# Patient Record
Sex: MALE | Race: OTHER | Employment: FULL TIME | URBAN - METROPOLITAN AREA
[De-identification: names, ages, dates, MRNs, and addresses within clinical notes are randomized per-mention and may not be internally consistent; named-entity substitution may affect disease eponyms.]

---

## 2017-05-31 ENCOUNTER — APPOINTMENT (OUTPATIENT)
Dept: ULTRASOUND IMAGING | Age: 32
DRG: 432 | End: 2017-05-31
Attending: STUDENT IN AN ORGANIZED HEALTH CARE EDUCATION/TRAINING PROGRAM
Payer: SELF-PAY

## 2017-05-31 ENCOUNTER — HOSPITAL ENCOUNTER (INPATIENT)
Age: 32
LOS: 4 days | Discharge: HOME | DRG: 432 | End: 2017-06-05
Attending: STUDENT IN AN ORGANIZED HEALTH CARE EDUCATION/TRAINING PROGRAM | Admitting: HOSPITALIST
Payer: SELF-PAY

## 2017-05-31 ENCOUNTER — APPOINTMENT (OUTPATIENT)
Dept: CT IMAGING | Age: 32
DRG: 432 | End: 2017-05-31
Attending: STUDENT IN AN ORGANIZED HEALTH CARE EDUCATION/TRAINING PROGRAM
Payer: SELF-PAY

## 2017-05-31 DIAGNOSIS — K85.20 ALCOHOL-INDUCED ACUTE PANCREATITIS, UNSPECIFIED COMPLICATION STATUS: Primary | ICD-10-CM

## 2017-05-31 DIAGNOSIS — K70.10 ALCOHOLIC HEPATITIS WITHOUT ASCITES: ICD-10-CM

## 2017-05-31 PROBLEM — K85.90 PANCREATITIS: Status: ACTIVE | Noted: 2017-05-31

## 2017-05-31 LAB
ABO + RH BLD: NORMAL
ALBUMIN SERPL BCP-MCNC: 3 G/DL (ref 3.5–5)
ALBUMIN/GLOB SERPL: 0.6 {RATIO} (ref 1.1–2.2)
ALP SERPL-CCNC: 130 U/L (ref 45–117)
ALT SERPL-CCNC: 108 U/L (ref 12–78)
ANION GAP BLD CALC-SCNC: 14 MMOL/L (ref 5–15)
APPEARANCE UR: CLEAR
APTT PPP: 25.4 SEC (ref 22.1–32.5)
AST SERPL W P-5'-P-CCNC: 158 U/L (ref 15–37)
BACTERIA URNS QL MICRO: NEGATIVE /HPF
BASOPHILS # BLD AUTO: 0 K/UL (ref 0–0.1)
BASOPHILS # BLD: 0 % (ref 0–1)
BILIRUB SERPL-MCNC: 0.5 MG/DL (ref 0.2–1)
BILIRUB UR QL: NEGATIVE
BLOOD GROUP ANTIBODIES SERPL: NORMAL
BUN SERPL-MCNC: 14 MG/DL (ref 6–20)
BUN/CREAT SERPL: 15 (ref 12–20)
CALCIUM SERPL-MCNC: 8.1 MG/DL (ref 8.5–10.1)
CHLORIDE SERPL-SCNC: 100 MMOL/L (ref 97–108)
CHOLEST SERPL-MCNC: 217 MG/DL
CO2 SERPL-SCNC: 24 MMOL/L (ref 21–32)
COLOR UR: ABNORMAL
CREAT SERPL-MCNC: 0.96 MG/DL (ref 0.7–1.3)
EOSINOPHIL # BLD: 0 K/UL (ref 0–0.4)
EOSINOPHIL NFR BLD: 0 % (ref 0–7)
EPITH CASTS URNS QL MICRO: ABNORMAL /LPF
ERYTHROCYTE [DISTWIDTH] IN BLOOD BY AUTOMATED COUNT: 12.7 % (ref 11.5–14.5)
ETHANOL SERPL-MCNC: 303 MG/DL
GLOBULIN SER CALC-MCNC: 4.9 G/DL (ref 2–4)
GLUCOSE SERPL-MCNC: 117 MG/DL (ref 65–100)
GLUCOSE UR STRIP.AUTO-MCNC: NEGATIVE MG/DL
HCT VFR BLD AUTO: 43 % (ref 36.6–50.3)
HCT VFR BLD AUTO: 47.3 % (ref 36.6–50.3)
HDLC SERPL-MCNC: 52 MG/DL
HDLC SERPL: 4.2 {RATIO} (ref 0–5)
HGB BLD-MCNC: 15.8 G/DL (ref 12.1–17)
HGB BLD-MCNC: 17.5 G/DL (ref 12.1–17)
HGB UR QL STRIP: ABNORMAL
HYALINE CASTS URNS QL MICRO: ABNORMAL /LPF (ref 0–5)
INR PPP: 1 (ref 0.9–1.1)
KETONES UR QL STRIP.AUTO: ABNORMAL MG/DL
LDLC SERPL CALC-MCNC: ABNORMAL MG/DL (ref 0–100)
LDLC SERPL DIRECT ASSAY-MCNC: 71 MG/DL (ref 0–100)
LEUKOCYTE ESTERASE UR QL STRIP.AUTO: NEGATIVE
LIPASE SERPL-CCNC: 1089 U/L (ref 73–393)
LIPID PROFILE,FLP: ABNORMAL
LYMPHOCYTES # BLD AUTO: 57 % (ref 12–49)
LYMPHOCYTES # BLD: 3.9 K/UL (ref 0.8–3.5)
MAGNESIUM SERPL-MCNC: 2 MG/DL (ref 1.6–2.4)
MCH RBC QN AUTO: 31.8 PG (ref 26–34)
MCHC RBC AUTO-ENTMCNC: 37 G/DL (ref 30–36.5)
MCV RBC AUTO: 86 FL (ref 80–99)
MONOCYTES # BLD: 0.3 K/UL (ref 0–1)
MONOCYTES NFR BLD AUTO: 5 % (ref 5–13)
NEUTS SEG # BLD: 2.6 K/UL (ref 1.8–8)
NEUTS SEG NFR BLD AUTO: 38 % (ref 32–75)
NITRITE UR QL STRIP.AUTO: NEGATIVE
PH UR STRIP: 5.5 [PH] (ref 5–8)
PLATELET # BLD AUTO: 222 K/UL (ref 150–400)
POTASSIUM SERPL-SCNC: 3.3 MMOL/L (ref 3.5–5.1)
PROT SERPL-MCNC: 7.9 G/DL (ref 6.4–8.2)
PROT UR STRIP-MCNC: 300 MG/DL
PROTHROMBIN TIME: 10.3 SEC (ref 9–11.1)
RBC # BLD AUTO: 5.5 M/UL (ref 4.1–5.7)
RBC #/AREA URNS HPF: ABNORMAL /HPF (ref 0–5)
SODIUM SERPL-SCNC: 138 MMOL/L (ref 136–145)
SP GR UR REFRACTOMETRY: 1.01 (ref 1–1.03)
SPECIMEN EXP DATE BLD: NORMAL
THERAPEUTIC RANGE,PTTT: NORMAL SECS (ref 58–77)
TRIGL SERPL-MCNC: 1159 MG/DL (ref ?–150)
UROBILINOGEN UR QL STRIP.AUTO: 0.2 EU/DL (ref 0.2–1)
VLDLC SERPL CALC-MCNC: ABNORMAL MG/DL
WBC # BLD AUTO: 6.9 K/UL (ref 4.1–11.1)
WBC URNS QL MICRO: ABNORMAL /HPF (ref 0–4)

## 2017-05-31 PROCEDURE — 81001 URINALYSIS AUTO W/SCOPE: CPT | Performed by: STUDENT IN AN ORGANIZED HEALTH CARE EDUCATION/TRAINING PROGRAM

## 2017-05-31 PROCEDURE — 74177 CT ABD & PELVIS W/CONTRAST: CPT

## 2017-05-31 PROCEDURE — 74011000258 HC RX REV CODE- 258: Performed by: STUDENT IN AN ORGANIZED HEALTH CARE EDUCATION/TRAINING PROGRAM

## 2017-05-31 PROCEDURE — 74011250636 HC RX REV CODE- 250/636: Performed by: INTERNAL MEDICINE

## 2017-05-31 PROCEDURE — 86900 BLOOD TYPING SEROLOGIC ABO: CPT | Performed by: STUDENT IN AN ORGANIZED HEALTH CARE EDUCATION/TRAINING PROGRAM

## 2017-05-31 PROCEDURE — 83721 ASSAY OF BLOOD LIPOPROTEIN: CPT | Performed by: FAMILY MEDICINE

## 2017-05-31 PROCEDURE — 74011250637 HC RX REV CODE- 250/637: Performed by: FAMILY MEDICINE

## 2017-05-31 PROCEDURE — 83735 ASSAY OF MAGNESIUM: CPT | Performed by: FAMILY MEDICINE

## 2017-05-31 PROCEDURE — 99218 HC RM OBSERVATION: CPT

## 2017-05-31 PROCEDURE — C9113 INJ PANTOPRAZOLE SODIUM, VIA: HCPCS | Performed by: FAMILY MEDICINE

## 2017-05-31 PROCEDURE — 36415 COLL VENOUS BLD VENIPUNCTURE: CPT | Performed by: FAMILY MEDICINE

## 2017-05-31 PROCEDURE — 85730 THROMBOPLASTIN TIME PARTIAL: CPT | Performed by: STUDENT IN AN ORGANIZED HEALTH CARE EDUCATION/TRAINING PROGRAM

## 2017-05-31 PROCEDURE — 85018 HEMOGLOBIN: CPT | Performed by: FAMILY MEDICINE

## 2017-05-31 PROCEDURE — 83690 ASSAY OF LIPASE: CPT | Performed by: EMERGENCY MEDICINE

## 2017-05-31 PROCEDURE — 80053 COMPREHEN METABOLIC PANEL: CPT | Performed by: EMERGENCY MEDICINE

## 2017-05-31 PROCEDURE — 74011250636 HC RX REV CODE- 250/636: Performed by: FAMILY MEDICINE

## 2017-05-31 PROCEDURE — 96376 TX/PRO/DX INJ SAME DRUG ADON: CPT

## 2017-05-31 PROCEDURE — 99284 EMERGENCY DEPT VISIT MOD MDM: CPT

## 2017-05-31 PROCEDURE — 74011000250 HC RX REV CODE- 250: Performed by: FAMILY MEDICINE

## 2017-05-31 PROCEDURE — 76705 ECHO EXAM OF ABDOMEN: CPT

## 2017-05-31 PROCEDURE — 74011636320 HC RX REV CODE- 636/320: Performed by: STUDENT IN AN ORGANIZED HEALTH CARE EDUCATION/TRAINING PROGRAM

## 2017-05-31 PROCEDURE — 74011250636 HC RX REV CODE- 250/636: Performed by: STUDENT IN AN ORGANIZED HEALTH CARE EDUCATION/TRAINING PROGRAM

## 2017-05-31 PROCEDURE — 96374 THER/PROPH/DIAG INJ IV PUSH: CPT

## 2017-05-31 PROCEDURE — 80307 DRUG TEST PRSMV CHEM ANLYZR: CPT | Performed by: STUDENT IN AN ORGANIZED HEALTH CARE EDUCATION/TRAINING PROGRAM

## 2017-05-31 PROCEDURE — 80061 LIPID PANEL: CPT | Performed by: FAMILY MEDICINE

## 2017-05-31 PROCEDURE — 85025 COMPLETE CBC W/AUTO DIFF WBC: CPT | Performed by: EMERGENCY MEDICINE

## 2017-05-31 PROCEDURE — 85610 PROTHROMBIN TIME: CPT | Performed by: STUDENT IN AN ORGANIZED HEALTH CARE EDUCATION/TRAINING PROGRAM

## 2017-05-31 RX ORDER — SODIUM CHLORIDE 0.9 % (FLUSH) 0.9 %
5-10 SYRINGE (ML) INJECTION AS NEEDED
Status: DISCONTINUED | OUTPATIENT
Start: 2017-05-31 | End: 2017-06-05 | Stop reason: HOSPADM

## 2017-05-31 RX ORDER — METOPROLOL TARTRATE 5 MG/5ML
5 INJECTION INTRAVENOUS ONCE
Status: COMPLETED | OUTPATIENT
Start: 2017-05-31 | End: 2017-05-31

## 2017-05-31 RX ORDER — KETOROLAC TROMETHAMINE 30 MG/ML
15 INJECTION, SOLUTION INTRAMUSCULAR; INTRAVENOUS
Status: DISCONTINUED | OUTPATIENT
Start: 2017-05-31 | End: 2017-06-01

## 2017-05-31 RX ORDER — SODIUM CHLORIDE 0.9 % (FLUSH) 0.9 %
10 SYRINGE (ML) INJECTION
Status: COMPLETED | OUTPATIENT
Start: 2017-05-31 | End: 2017-05-31

## 2017-05-31 RX ORDER — ONDANSETRON 2 MG/ML
4 INJECTION INTRAMUSCULAR; INTRAVENOUS
Status: DISCONTINUED | OUTPATIENT
Start: 2017-05-31 | End: 2017-06-05 | Stop reason: HOSPADM

## 2017-05-31 RX ORDER — HYDROMORPHONE HYDROCHLORIDE 1 MG/ML
1 INJECTION, SOLUTION INTRAMUSCULAR; INTRAVENOUS; SUBCUTANEOUS
Status: DISCONTINUED | OUTPATIENT
Start: 2017-05-31 | End: 2017-05-31

## 2017-05-31 RX ORDER — POTASSIUM CHLORIDE 7.45 MG/ML
10 INJECTION INTRAVENOUS
Status: COMPLETED | OUTPATIENT
Start: 2017-05-31 | End: 2017-05-31

## 2017-05-31 RX ORDER — LORAZEPAM 1 MG/1
2 TABLET ORAL
Status: DISCONTINUED | OUTPATIENT
Start: 2017-05-31 | End: 2017-06-01

## 2017-05-31 RX ORDER — HYDROMORPHONE HYDROCHLORIDE 1 MG/ML
1 INJECTION, SOLUTION INTRAMUSCULAR; INTRAVENOUS; SUBCUTANEOUS ONCE
Status: COMPLETED | OUTPATIENT
Start: 2017-05-31 | End: 2017-05-31

## 2017-05-31 RX ORDER — SODIUM CHLORIDE 0.9 % (FLUSH) 0.9 %
5-10 SYRINGE (ML) INJECTION EVERY 8 HOURS
Status: DISCONTINUED | OUTPATIENT
Start: 2017-05-31 | End: 2017-06-05 | Stop reason: HOSPADM

## 2017-05-31 RX ORDER — IBUPROFEN 200 MG
400-800 TABLET ORAL AS NEEDED
COMMUNITY
End: 2017-06-05

## 2017-05-31 RX ORDER — SODIUM CHLORIDE, SODIUM LACTATE, POTASSIUM CHLORIDE, CALCIUM CHLORIDE 600; 310; 30; 20 MG/100ML; MG/100ML; MG/100ML; MG/100ML
100 INJECTION, SOLUTION INTRAVENOUS CONTINUOUS
Status: DISCONTINUED | OUTPATIENT
Start: 2017-05-31 | End: 2017-06-05 | Stop reason: HOSPADM

## 2017-05-31 RX ORDER — HYDRALAZINE HYDROCHLORIDE 20 MG/ML
10 INJECTION INTRAMUSCULAR; INTRAVENOUS
Status: DISCONTINUED | OUTPATIENT
Start: 2017-05-31 | End: 2017-06-05 | Stop reason: HOSPADM

## 2017-05-31 RX ORDER — HYDROMORPHONE HYDROCHLORIDE 1 MG/ML
1 INJECTION, SOLUTION INTRAMUSCULAR; INTRAVENOUS; SUBCUTANEOUS
Status: DISCONTINUED | OUTPATIENT
Start: 2017-05-31 | End: 2017-06-01

## 2017-05-31 RX ORDER — HYDRALAZINE HYDROCHLORIDE 20 MG/ML
5 INJECTION INTRAMUSCULAR; INTRAVENOUS ONCE
Status: COMPLETED | OUTPATIENT
Start: 2017-05-31 | End: 2017-05-31

## 2017-05-31 RX ADMIN — LORAZEPAM 2 MG: 1 TABLET ORAL at 18:00

## 2017-05-31 RX ADMIN — POTASSIUM CHLORIDE 10 MEQ: 10 INJECTION, SOLUTION INTRAVENOUS at 18:00

## 2017-05-31 RX ADMIN — HYDROMORPHONE HYDROCHLORIDE 1 MG: 1 INJECTION, SOLUTION INTRAMUSCULAR; INTRAVENOUS; SUBCUTANEOUS at 13:31

## 2017-05-31 RX ADMIN — SODIUM CHLORIDE, SODIUM LACTATE, POTASSIUM CHLORIDE, AND CALCIUM CHLORIDE 250 ML/HR: 600; 310; 30; 20 INJECTION, SOLUTION INTRAVENOUS at 18:00

## 2017-05-31 RX ADMIN — METOPROLOL TARTRATE 5 MG: 5 INJECTION INTRAVENOUS at 20:47

## 2017-05-31 RX ADMIN — SODIUM CHLORIDE 1000 ML: 900 INJECTION, SOLUTION INTRAVENOUS at 13:31

## 2017-05-31 RX ADMIN — SODIUM CHLORIDE 100 ML: 900 INJECTION, SOLUTION INTRAVENOUS at 15:57

## 2017-05-31 RX ADMIN — POTASSIUM CHLORIDE 10 MEQ: 10 INJECTION, SOLUTION INTRAVENOUS at 16:43

## 2017-05-31 RX ADMIN — HYDRALAZINE HYDROCHLORIDE 5 MG: 20 INJECTION INTRAMUSCULAR; INTRAVENOUS at 18:35

## 2017-05-31 RX ADMIN — Medication 10 ML: at 15:57

## 2017-05-31 RX ADMIN — SODIUM CHLORIDE 40 MG: 9 INJECTION INTRAMUSCULAR; INTRAVENOUS; SUBCUTANEOUS at 20:36

## 2017-05-31 RX ADMIN — HYDROMORPHONE HYDROCHLORIDE 1 MG: 1 INJECTION, SOLUTION INTRAMUSCULAR; INTRAVENOUS; SUBCUTANEOUS at 18:35

## 2017-05-31 RX ADMIN — HYDROMORPHONE HYDROCHLORIDE 1 MG: 1 INJECTION, SOLUTION INTRAMUSCULAR; INTRAVENOUS; SUBCUTANEOUS at 20:40

## 2017-05-31 RX ADMIN — ONDANSETRON 4 MG: 2 INJECTION INTRAMUSCULAR; INTRAVENOUS at 23:18

## 2017-05-31 RX ADMIN — HYDROMORPHONE HYDROCHLORIDE 1 MG: 1 INJECTION, SOLUTION INTRAMUSCULAR; INTRAVENOUS; SUBCUTANEOUS at 15:12

## 2017-05-31 RX ADMIN — ONDANSETRON 4 MG: 2 INJECTION INTRAMUSCULAR; INTRAVENOUS at 19:35

## 2017-05-31 RX ADMIN — IOPAMIDOL 100 ML: 755 INJECTION, SOLUTION INTRAVENOUS at 15:57

## 2017-05-31 RX ADMIN — POTASSIUM CHLORIDE 10 MEQ: 10 INJECTION, SOLUTION INTRAVENOUS at 18:34

## 2017-05-31 RX ADMIN — LORAZEPAM 2 MG: 1 TABLET ORAL at 20:40

## 2017-05-31 RX ADMIN — Medication 10 ML: at 17:00

## 2017-05-31 RX ADMIN — HYDROMORPHONE HYDROCHLORIDE 1 MG: 1 INJECTION, SOLUTION INTRAMUSCULAR; INTRAVENOUS; SUBCUTANEOUS at 22:55

## 2017-05-31 RX ADMIN — Medication 10 ML: at 22:57

## 2017-05-31 RX ADMIN — HYDROMORPHONE HYDROCHLORIDE 1 MG: 1 INJECTION, SOLUTION INTRAMUSCULAR; INTRAVENOUS; SUBCUTANEOUS at 16:40

## 2017-05-31 RX ADMIN — LORAZEPAM 2 MG: 1 TABLET ORAL at 23:18

## 2017-05-31 RX ADMIN — HYDRALAZINE HYDROCHLORIDE 10 MG: 20 INJECTION INTRAMUSCULAR; INTRAVENOUS at 22:55

## 2017-05-31 RX ADMIN — KETOROLAC TROMETHAMINE 15 MG: 30 INJECTION, SOLUTION INTRAMUSCULAR at 23:36

## 2017-05-31 NOTE — IP AVS SNAPSHOT
[address] 
[phone] Patient: Ivon Calvin MRN: [id number] ISF:[id number] You are allergic to the following No active allergies Recent Documentation Height Weight BMI Smoking Status 1.676 m 83.9 kg 29.86 kg/m2 Never Smoker Unresulted Labs Order Current Status IGG SUBCLASS 4 In process Emergency Contacts Name Discharge Info Relation Home Work Mobile Tawanda Peter DISCHARGE CAREGIVER [3] Father [15]   [phone] About your hospitalization You were admitted on:  May 31, 2017 You last received care in the:  [address] [place] [address] You were discharged on:  June 5, 2017 Unit phone number:  [phone] Why you were hospitalized Your primary diagnosis was:  Pancreatitis Providers Seen During Your Hospitalizations Provider Role Specialty Primary office phone Candi Blue MD Attending Provider Emergency Medicine [phone] Leila Saldana MD Attending Provider Hospitalist [phone] Candi Vásquez MD Attending Provider Internal Medicine [phone] Your Primary Care Physician (PCP) Primary Care Physician Office Phone Office Fax NONE ** None ** ** None ** Follow-up Information Follow up With Details Comments Contact Info Primary care doctor Call For follow up after hospitalization Please make an appointment with a Primary Care Doctor to follow up on your high blood pressure and recurrent pancreatitis Current Discharge Medication List  
  
START taking these medications Dose & Instructions Dispensing Information Comments Morning Noon Evening Bedtime  
 amLODIPine 10 mg tablet Commonly known as:  [place] Sunita Your last dose was: Your next dose is:    
   
   
 Dose:  10 mg Take 1 Tab by mouth daily. Quantity:  30 Tab Refills:  0 ondansetron 4 mg disintegrating tablet Commonly known as:  ZOFRAN ODT Your last dose was: Your next dose is:    
   
   
 Dose:  4 mg Take 1 Tab by mouth every eight (8) hours as needed for Nausea. Quantity:  10 Tab Refills:  0  
     
   
   
   
  
 oxyCODONE IR 5 mg immediate release tablet Commonly known as:  Tia Flatter Your last dose was: Your next dose is:    
   
   
 Dose:  5-10 mg Take 1-2 Tabs by mouth every four (4) hours as needed for Pain. Max Daily Amount: 60 mg.  
 Quantity:  20 Tab Refills:  0  
     
   
   
   
  
 pantoprazole 40 mg tablet Commonly known as:  PROTONIX Your last dose was: Your next dose is:    
   
   
 Dose:  40 mg Take 1 Tab by mouth Daily (before breakfast). Quantity:  30 Tab Refills:  0 STOP taking these medications ADVIL 200 mg tablet Generic drug:  ibuprofen Where to Get Your Medications Information on where to get these meds will be given to you by the nurse or doctor. ! Ask your nurse or doctor about these medications  
  amLODIPine 10 mg tablet  
 ondansetron 4 mg disintegrating tablet  
 oxyCODONE IR 5 mg immediate release tablet  
 pantoprazole 40 mg tablet Discharge Instructions Please bring this form with you to show your primary care provider at your follow-up appointment. Primary care provider:  Dr. Zeny Bynum Discharging provider:  Cornelia Perkins MD 
 
You have been admitted to the hospital with the following diagnoses: · Stomach bleed · Pancreatitis FOLLOW-UP CARE RECOMMENDATIONS: 
 
APPOINTMENTS: 
Follow-up Information Follow up With Details Comments Contact Info Primary care doctor Call For follow up after hospitalization Please make an appointment with a Primary Care Doctor to follow up on your high blood pressure and recurrent pancreatitis FOLLOW-UP TESTS recommended: Blood pressure check SYMPTOMS to watch for: nausea, vomiting, diarrhea, bleeding, black tarry stools. DIET/what to eat:  Please start with bland, low-fat diet (no oil, butter, fats) and add back foods as you feel better and better. Please drink plenty of fluids. ACTIVITY:  Activity as tolerated and No driving while on narcotics What to do if new or unexpected symptoms occur? If you experience any of the above symptoms (or should other concerns or questions arise after discharge) please call your primary care physician. Return to the emergency room if you cannot get hold of your doctor. · It is very important that you keep your follow-up appointment(s). · Please bring discharge papers, medication list (and/or medication bottles) to your follow-up appointments for review by your outpatient provider(s). · Please check the list of medications and be sure it includes every medication (even non-prescription medications) that your provider wants you to take. · It is important that you take the medication exactly as they are prescribed. · Keep your medication in the bottles provided by the pharmacist and keep a list of the medication names, dosages, and times to be taken in your wallet. · Do not take other medications without consulting your doctor. · If you have any questions about your medications or other instructions, please talk to your nurse or care provider before you leave the hospital. 
 
I understand that if any problems occur once I am at home I am to contact my physician. These instructions were explained to me and I had the opportunity to ask questions. Discharge Orders None [facility] Announcement We are excited to announce that we are making your provider's discharge notes available to you in [employer].   You will see these notes when they are completed and signed by the physician that discharged you from your recent hospital stay. If you have any questions or concerns about any information you see in [employer], please call the Health Information Department where you were seen or reach out to your Primary Care Provider for more information about your plan of care. Introducing \[facility] [place] [facility]\"" & HEALTH SERVICES! Sadaalyce Bynum introduces [employer] patient portal. Now you can access parts of your medical record, email your doctor's office, and request medication refills online. 1. In your internet browser, go to [email or web address] 2. Click on the First Time User? Click Here link in the Sign In box. You will see the New Member Sign Up page. 3. Enter your [employer] Access Code exactly as it appears below. You will not need to use this code after youve completed the sign-up process. If you do not sign up before the expiration date, you must request a new code. · [employer] Access Code: [facility] Expires: 8/30/2017  1:19 PM 
 
4. Enter the last four digits of your Social Security Number (xxxx) and Date of Birth (mm/dd/yyyy) as indicated and click Submit. You will be taken to the next sign-up page. 5. Create a [employer] ID. This will be your [employer] login ID and cannot be changed, so think of one that is secure and easy to remember. 6. Create a [employer] password. You can change your password at any time. 7. Enter your Password Reset Question and Answer. This can be used at a later time if you forget your password. 8. Enter your e-mail address. You will receive e-mail notification when new information is available in [address] 9. Click Sign Up. You can now view and download portions of your medical record. 10. Click the Download Summary menu link to download a portable copy of your medical information. If you have questions, please visit the Frequently Asked Questions section of the [employer] website. Remember, [employer] is NOT to be used for urgent needs. For medical emergencies, dial 911. Now available from your iPhone and Android! General Information Please provide this summary of care documentation to your next provider. Patient Signature:  ____________________________________________________________ Date:  ____________________________________________________________  
  
Zia Abelson Provider Signature:  ____________________________________________________________ Date:  ____________________________________________________________

## 2017-05-31 NOTE — ROUTINE PROCESS
TRANSFER - OUT REPORT:    Verbal report given to Fernando ZAMARRIPA(name) on Ace Polanco  being transferred to Room 548(unit) for routine progression of care       Report consisted of patients Situation, Background, Assessment and   Recommendations(SBAR). Information from the following report(s) SBAR, Kardex, ED Summary, [place] and Recent Results was reviewed with the receiving nurse. Lines:   Peripheral IV 05/31/17 Left Antecubital (Active)   Site Assessment Clean, dry, & intact 5/31/2017  3:28 PM   Phlebitis Assessment 0 5/31/2017  3:28 PM   Infiltration Assessment 0 5/31/2017  3:28 PM   Dressing Status Clean, dry, & intact 5/31/2017  3:28 PM        Opportunity for questions and clarification was provided.       Patient transported with:   [employer]

## 2017-05-31 NOTE — PROGRESS NOTES
Admission Medication Reconciliation:    Information obtained from: Patient    Significant PMH/Disease States:   Past Medical History:   Diagnosis Date    ETOH abuse     Hypertension     Pancreatitis        Chief Complaint for this Admission:    Chief Complaint   Patient presents with    Abdominal Pain       Allergies:  Review of patient's allergies indicates no known allergies. Prior to Admission Medications:   Prior to Admission Medications   Prescriptions Last Dose Informant Patient Reported? Taking?   ibuprofen (ADVIL) 200 mg tablet   Yes Yes   Sig: Take 400-800 mg by mouth as needed for Pain. Patient takes 2-4 tablets (400-800 mg) occasionally as needed for pain. Facility-Administered Medications: None         Comments/Recommendations:     Spoke with patient regarding allergies and PTA medications. 1) Confirmed NKDA. 2) Updated PTA medication list. Added ibuprofen. The patient states he takes 2-4 tablets (400-800 mg) as needed for pain. The patient has not taken any today. The patient denies any other prescription/OTC/vitamin/supplement use.       Francisco Carbajal, PharmD

## 2017-05-31 NOTE — IP AVS SNAPSHOT
Current Discharge Medication List  
  
START taking these medications Dose & Instructions Dispensing Information Comments Morning Noon Evening Bedtime  
 amLODIPine 10 mg tablet Commonly known as:  [place] Puls Your last dose was: Your next dose is:    
   
   
 Dose:  10 mg Take 1 Tab by mouth daily. Quantity:  30 Tab Refills:  0  
     
   
   
   
  
 ondansetron 4 mg disintegrating tablet Commonly known as:  ZOFRAN ODT Your last dose was: Your next dose is:    
   
   
 Dose:  4 mg Take 1 Tab by mouth every eight (8) hours as needed for Nausea. Quantity:  10 Tab Refills:  0  
     
   
   
   
  
 oxyCODONE IR 5 mg immediate release tablet Commonly known as:  Darl Bohr Your last dose was: Your next dose is:    
   
   
 Dose:  5-10 mg Take 1-2 Tabs by mouth every four (4) hours as needed for Pain. Max Daily Amount: 60 mg.  
 Quantity:  20 Tab Refills:  0  
     
   
   
   
  
 pantoprazole 40 mg tablet Commonly known as:  PROTONIX Your last dose was: Your next dose is:    
   
   
 Dose:  40 mg Take 1 Tab by mouth Daily (before breakfast). Quantity:  30 Tab Refills:  0 STOP taking these medications ADVIL 200 mg tablet Generic drug:  ibuprofen Where to Get Your Medications Information on where to get these meds will be given to you by the nurse or doctor. ! Ask your nurse or doctor about these medications  
  amLODIPine 10 mg tablet  
 ondansetron 4 mg disintegrating tablet  
 oxyCODONE IR 5 mg immediate release tablet  
 pantoprazole 40 mg tablet

## 2017-05-31 NOTE — IP AVS SNAPSHOT
Summary of Care Report The Summary of Care report has been created to help improve care coordination. Users with access to [employer] or [address] (Web-based application) may access additional patient information including the Discharge Summary. If you are not currently a [address] user and need more information, please call the number listed below in the [place] [address] section and ask to be connected with Medical Records. Facility Information Name Address Phone [phone] [facility] [address] [phone] Patient Information Patient Name Sex ANTHONY Ruiz ([id number]) Male 1985 Discharge Information Admitting Provider Service Area Unit Latasha Nixon MD /  [address] [facility] [phone] Discharge Provider Discharge Date/Time Discharge Disposition Destination (none) 2017 (Pending) AHR (none) Patient Language Language ENGLISH [13] Hospital Problems as of 2017  Reviewed: 2017  4:20 PM by Grecia Razo MD  
  
  
  
 Class Noted - Resolved Last Modified POA Active Problems * (Principal)Pancreatitis  2017 - Present 2017 by Grecia Razo MD Unknown Entered by Grecia Razo MD  
  
Non-Hospital Problems as of 2017  Reviewed: 2017  4:20 PM by Grecia Razo MD  
 None You are allergic to the following No active allergies Current Discharge Medication List  
  
START taking these medications Dose & Instructions Dispensing Information Comments  
 amLODIPine 10 mg tablet Commonly known as:  Eward Adiit Dose:  10 mg Take 1 Tab by mouth daily. Quantity:  30 Tab Refills:  0  
   
 ondansetron 4 mg disintegrating tablet Commonly known as:  ZOFRAN ODT Dose:  4 mg Take 1 Tab by mouth every eight (8) hours as needed for Nausea. Quantity:  10 Tab Refills:  0  
   
 oxyCODONE IR 5 mg immediate release tablet Commonly known as:  Kamille Fennel Dose:  5-10 mg Take 1-2 Tabs by mouth every four (4) hours as needed for Pain. Max Daily Amount: 60 mg.  
 Quantity:  20 Tab Refills:  0  
   
 pantoprazole 40 mg tablet Commonly known as:  PROTONIX Dose:  40 mg Take 1 Tab by mouth Daily (before breakfast). Quantity:  30 Tab Refills:  0 STOP taking these medications Comments ADVIL 200 mg tablet Generic drug:  ibuprofen Surgery Information ID Date/Time Status Primary Surgeon All Procedures Location [id number] 6/1/2017 [facility]ly [facility] [address], MD ESOPHAGOGASTRODUODENOSCOPY (EGD) ESOPHAGOGASTRODUODENAL (EGD) BIOPSY [facility] ENDOSCOPY    
 ESOPHAGOGASTRODUODENOSCOPY (EGD):  egd Follow-up Information Follow up With Details Comments Contact Info Primary care doctor Call For follow up after hospitalization Please make an appointment with a Primary Care Doctor to follow up on your high blood pressure and recurrent pancreatitis Discharge Instructions Please bring this form with you to show your primary care provider at your follow-up appointment. Primary care provider:  Dr. Cy Brenner Discharging provider:  Dez Cabezas MD 
 
You have been admitted to the hospital with the following diagnoses: · Stomach bleed · Pancreatitis FOLLOW-UP CARE RECOMMENDATIONS: 
 
APPOINTMENTS: 
Follow-up Information Follow up With Details Comments Contact Info Primary care doctor Call For follow up after hospitalization Please make an appointment with a Primary Care Doctor to follow up on your high blood pressure and recurrent pancreatitis FOLLOW-UP TESTS recommended: Blood pressure check SYMPTOMS to watch for: nausea, vomiting, diarrhea, bleeding, black tarry stools. DIET/what to eat:  Please start with bland, low-fat diet (no oil, butter, fats) and add back foods as you feel better and better. Please drink plenty of fluids. ACTIVITY:  Activity as tolerated and No driving while on narcotics What to do if new or unexpected symptoms occur? If you experience any of the above symptoms (or should other concerns or questions arise after discharge) please call your primary care physician. Return to the emergency room if you cannot get hold of your doctor. · It is very important that you keep your follow-up appointment(s). · Please bring discharge papers, medication list (and/or medication bottles) to your follow-up appointments for review by your outpatient provider(s). · Please check the list of medications and be sure it includes every medication (even non-prescription medications) that your provider wants you to take. · It is important that you take the medication exactly as they are prescribed. · Keep your medication in the bottles provided by the pharmacist and keep a list of the medication names, dosages, and times to be taken in your wallet. · Do not take other medications without consulting your doctor. · If you have any questions about your medications or other instructions, please talk to your nurse or care provider before you leave the hospital. 
 
I understand that if any problems occur once I am at home I am to contact my physician. These instructions were explained to me and I had the opportunity to ask questions. Chart Review Routing History No Routing History on File

## 2017-05-31 NOTE — ED PROVIDER NOTES
HPI Comments: 33 y.o. male with past medical history significant for pancreatitis, alcohol abuse, and hypertension who presents from home with chief complaint of abdominal pain. Patient reports history of pancreatitis and states that he recently has been drinking heavily (last drink 3 days ago). He arrives today complaining of diffuse severe abdominal pain that began after his last drink on Sunday. He states the pain is similar to past episodes of pancreatitis. He reports nausea with bloody emesis. He complains of constipation. He denies ever having had surgery on his abdomen. There are no other acute medical concerns at this time. Social hx: Nonsmoker. Alcohol use. PCP: None    Note written by kei Lee, as dictated by Susan Quintero MD 1:38 PM      The history is provided by the patient. Past Medical History:   Diagnosis Date    ETOH abuse     Hypertension     Pancreatitis        History reviewed. No pertinent surgical history. History reviewed. No pertinent family history. Social History     Social History    Marital status: SINGLE     Spouse name: N/A    Number of children: N/A    Years of education: N/A     Occupational History    Not on file. Social History Main Topics    Smoking status: Never Smoker    Smokeless tobacco: Not on file    Alcohol use Yes    Drug use: Not on file    Sexual activity: Not on file     Other Topics Concern    Not on file     Social History Narrative    No narrative on file         ALLERGIES: Review of patient's allergies indicates no known allergies. Review of Systems   Gastrointestinal: Positive for abdominal pain, constipation, nausea and vomiting. All other systems reviewed and are negative.       Vitals:    05/31/17 1228   BP: (!) 156/109   Pulse: (!) 112   Resp: 18   Temp: 97.9 °F (36.6 °C)   SpO2: 97%   Weight: 83.9 kg (185 lb)   Height: 5' 6\" (1.676 m)            Physical Exam   Constitutional: He is oriented to person, place, and time. He appears well-developed and well-nourished. He appears distressed (Mild). HENT:   Head: Normocephalic and atraumatic. Eyes: Conjunctivae and EOM are normal.   Neck: Normal range of motion. Cardiovascular: Normal rate and normal heart sounds. No murmur heard. Pulmonary/Chest: Effort normal and breath sounds normal. No respiratory distress. He has no wheezes. Abdominal: Soft. Bowel sounds are normal. He exhibits no distension. There is tenderness. There is guarding. There is no rebound. Moderate diffuse tenderness to palpation with mild guarding. Musculoskeletal: Normal range of motion. He exhibits no edema or tenderness. Neurological: He is alert and oriented to person, place, and time. No cranial nerve deficit. He exhibits normal muscle tone. Coordination normal.   Skin: Skin is warm and dry. He is not diaphoretic. Nursing note and vitals reviewed. Note written by kei Leach, as dictated by Kim Mayorga MD 1:39 PM      [facility]  ED Course       Procedures  CONSULT NOTE:  2:58 PM Kim Mayorga MD spoke with Dr. Miller Doctor, Consult for Hospitalist.  Discussed available diagnostic tests and clinical findings. He is in agreement with care plans as outlined and will admit.

## 2017-05-31 NOTE — H&P
[address] [facility] [address]   HISTORY AND PHYSICAL       Name:  Anita Goldman   MR#:  [id number]   :  1985   Account #:  [de-identified]        Date of Adm:  2017       ATTENDING PHYSICIAN: Hayden Barros MD.    CHIEF COMPLAINT: Abdominal pain. HISTORY OF PRESENT ILLNESS: The patient is a 41-year-old male   with past medical history of pancreatitis, alcohol abuse, hypertension,   who presents to the hospital with the above mentioned symptoms. The   patient reports his symptoms started about 2 days back. Over the   weekend he underwent binge drinking, drank about 15 to 20 beers on   a daily basis. Started experiencing some abdominal pain starting   . The day before yesterday had some vomiting and felt that   there was some blood in there, although cannot describe yesterday. He also had 1-2 episodes of diarrhea and felt that the stools were   \"glenis than usual\". The patient continued to have significant   abdominal pain and decided to come to the hospital for further   management and evaluation. The patient denies any other complaints   or problems. Denies any fevers associated with the symptoms. Denies   any falls, injuries, headache, blurry vision, sore throat, trouble   swallowing, trouble with speech, chest pain, shortness of breath,   cough, urinary symptoms, focal or generalized neurological weakness,   recent travel, sick contacts or any other concerns or problems. The   patient denies any history of drug abuse. PAST MEDICAL HISTORY: See above. HOME MEDICATIONS: Ibuprofen p.r.n. SOCIAL HISTORY: Denies tobacco abuse, drinks alcohol on a regular   basis. Denies IV drug abuse. Lives at home. ALLERGIES: NO KNOWN DRUG ALLERGIES. FAMILY HISTORY: Was discussed, was found to be noncontributory.     PHYSICAL EXAMINATION   VITAL SIGNS: Temperature 97.8, pulse 70, respiratory rate 18, blood   pressure 146/98, pulse oximetry 95% on room air.   GENERAL: Alert and oriented x3, awake, moderately distressed,   pleasant male, appears to be stated age. HEENT: Pupils equal and reactive to light. Dry mucous membranes. Tympanic membranes clear. NECK: Supple. CHEST: Clear to auscultation bilaterally. CORONARY S1, S2.   ABDOMEN:  Soft, tender to palpation diffusely. No rebound. Positive   for guarding. Bowel sounds were hypoactive. EXTREMITIES: No clubbing, no cyanosis, no edema. NEUROPSYCHIATRIC: Pleasant mood and affect. Sensory normal   limits. DTRs 1+. Strength 5/5. SKIN: Warm. LABORATORY DATA: White count 6.9, hemoglobin 13.5, hematocrit   47.2, platelets 746. INR 1, PT 10.3. Sodium 138, potassium ,   chloride 100, bicarbonate 24, anion gap 14, glucose 117, BUN 14,   creatinine 0.96, calcium 8.1, albumin 3, , , alkaline   phosphatase 130, lipase 1089. Blood alcohol level was 303. INR 1.0.      CT of the abdomen and pelvis is pending. Ultrasound of the abdomen   shows mild to moderate hepatic steatosis. ASSESSMENT AND PLAN:   1. Acute pancreatitis, appears to be alcohol-induced. The patient will   be admitted on a telemetry bed. Start the patient on aggressive IV   hydration, pain control, antiemetics and provide close monitoring. Will   obtain a CT of the abdomen. Will get GI consult and further   intervention will be per hospital course. Will optimize electrolytes and   monitor Accu-Cheks. Further intervention will be per hospital course. Reassess as needed. We will obtain magnesium level. 2. Gastrointestinal bleed. Hemoglobin and hematocrit stable. Will   start the patient on Protonix b.i.d. GI consult has been requested. Will   keep n.p.o. for now, IV hydration, hemoglobin and hematocrit every 12   hours. If falls below 7 may consider transfusion. This could be   secondary to a Anne-[place] tear, thus will await GI input. Further   intervention will be per hospital course. Reassess as needed.  CT of   the abdomen and pelvis has been ordered. Stool occult blood has   been ordered. 3. Hypokalemia. We will replace potassium. 4. History of alcohol abuse. The patient will be on CIWA protocol. the   patient appears to be tachycardic, most likely secondary to pain and/or   alcohol-related disease. Will provide folate, thiamine, multivitamin. Will   check magnesium level. Further intervention will be per hospital   course. Reassess as needed. 5. Gastrointestinal and deep venous thrombosis prophylaxis. The   patient will be on sequential compression devices and Protonix.         Sarah Leija MD MM / [employer]   D:  05/31/2017   16:28   T:  05/31/2017   16:51   Job #:  [id number]

## 2017-05-31 NOTE — ROUTINE PROCESS
TRANSFER - IN REPORT:    Verbal report received from Ana(name) on [place]  being received from ED(unit) for routine progression of care      Report consisted of patients Situation, Background, Assessment and   Recommendations(SBAR). Information from the following report(s) SBAR was reviewed with the receiving nurse. Opportunity for questions and clarification was provided. Assessment completed upon patients arrival to unit and care assumed.

## 2017-05-31 NOTE — CONSULTS
[address] [facility] NOTE  Aubree Merchant, [address] [facility] office  [phone] NP in-hospital cell phone M-F until 4:30  After 5pm or on weekends, please call  for physician on call        NAME:  Toney Almaguer   :   1985   MRN:   [id number]       Referring Physician: Dayana Erazo    Consult Date: 2017 4:05 PM    Chief Complaint: abdominal pain     History of Present Illness:  Patient is a 33 y.o. who is seen in consultation at the request of Dr. Kevin Monday for abdominal pain/GI bleed. Pt PMH as below. Pt presents for abdominal pain with nausea and vomiting blood. Pt has significant drinking history but states last drink on . Pt will not answer when asked directly about how much he drinks. Pt admits to daily use of Aleve. No smoking. History of pancreatitis, pt states \"last year\", then \"few months ago\" when asked when last pancreatitis attack. States yes to black stool, loose and no rectal bleeding. No dysphagia, constipation. All symptoms started . I have reviewed the emergency room note, hospital admission note, notes by all other clinicians who have seen the patient during this hospitalization to date. I have reviewed the problem list and the reason for this hospitalization. I have reviewed the allergies and the medications the patient was taking at home prior to this hospitalization. PMH:  Past Medical History:   Diagnosis Date    ETOH abuse     Hypertension     Pancreatitis        PSH:  History reviewed. No pertinent surgical history. Allergies:  No Known Allergies    Home Medications:  Prior to Admission Medications   Prescriptions Last Dose Informant Patient Reported? Taking?   ibuprofen (ADVIL) 200 mg tablet   Yes Yes   Sig: Take 400-800 mg by mouth as needed for Pain. Patient takes 2-4 tablets (400-800 mg) occasionally as needed for pain.       Facility-Administered Medications: None       Hospital Medications:  No current facility-administered medications for this encounter. Current Outpatient Prescriptions   Medication Sig    ibuprofen (ADVIL) 200 mg tablet Take 400-800 mg by mouth as needed for Pain. Patient takes 2-4 tablets (400-800 mg) occasionally as needed for pain. Social History:  Social History   Substance Use Topics    Smoking status: Never Smoker    Smokeless tobacco: Not on file    Alcohol use Yes       Family History:  History reviewed. No pertinent family history.     Review of Systems:  Constitutional: negative fever, negative chills, negative weight loss  Eyes:   negative visual changes  ENT:   negative sore throat, tongue or lip swelling  Respiratory:  negative cough, negative dyspnea  Cards:  negative for chest pain, palpitations, lower extremity edema  GI:   See HPI  :  negative for frequency, dysuria  Integument:  negative for rash and pruritus  Heme:  negative for easy bruising and gum/nose bleeding  Musculoskel: negative for myalgias, back pain and muscle weakness  Neuro: negative for headaches, dizziness, vertigo  Psych:  negative for feelings of anxiety, depression     Objective:   Patient Vitals for the past 8 hrs:   BP Temp Pulse Resp SpO2 Height Weight   05/31/17 1500 146/90 97.8 °F (36.6 °C) (!) 108 18 95 % - -   05/31/17 1430 141/89 - - - - - -   05/31/17 1228 (!) 156/109 97.9 °F (36.6 °C) (!) 112 18 97 % 5' 6\" (1.676 m) 83.9 kg (185 lb)             EXAM:     CONST:  Male in bed appears uncomfortable, alcohol odor detected   NEURO:  alert and oriented x 3   HEENT: EOMI, no scleral icterus   LUNGS: clear to ausculation, (-) wheeze   CARD:  regular rate and rhythm, S1 S2   ABD:  soft, ++ tender - especially right upper an d, no rebound, bowel sounds (+) all 4 quadrants, no masses, non distended   EXT:  no edema, warm   PSYCH: full, not anxious     Data Review     Recent Labs      05/31/17   1238   WBC  6.9   HGB  17.5*   HCT  47.3 PLT  222     Recent Labs      05/31/17   1238   NA  138   K  3.3*   CL  100   CO2  24   BUN  14   CREA  0.96   GLU  117*   CA  8.1*     Recent Labs      05/31/17   1238   SGOT  158*   AP  130*   TP  7.9   ALB  3.0*   GLOB  4.9*   LPSE  1089*     Recent Labs      05/31/17   1238   INR  1.0   PTP  10.3   APTT  25.4     Alcohol level: 300  Abdominal ultrasound shows hepatic steatosis     Assessment:   · Pancreatitis: abdominal pain with elevated lipase, history of same with significant drinking history  · Hematemesis: epigastric pain, nausea, significant drinking history, daily NSAID use   There is no problem list on file for this patient. Plan:   · EGD tomorrow to evaluate for esophagitis, PUD, MWT  · NPO except for meds  · Obtain consent  · Agree with BID PPI  · Fluids at 250 cc/hr for pancreatitis  · Pain, nausea medicine  · Thank you for allowing me to participate in care of [address] [place] [address] By: Sukhwinder Covarrubias. Dar Bangura NP     5/31/2017  4:05 PM       GI attending note:    Gen: NAD; Cardiac: nml S1, S2; Pulm: CTA B; Abd: normoactive BS, tender in the epigastric/LUQ area, nd, no rebound/involuntary guarding. Vitals, labs, and imaging reviewed. Agree with the history, physical, and plan of the NP. Acute EtOH induced pancreatitis. Hematemesis yesterday. Hemodynamically stable. Will evaluate for PUD, esophagitis, or MWT. PPI. Serial CBCs, transfuse PRN. Thank you for this consultation.     Dr. Terri Joseph

## 2017-05-31 NOTE — ED NOTES
Patient reports, \"I have a problem with drinking, this is where the pain is coming from\".  Reports last drink on Sunday, patient drinks beer

## 2017-06-01 ENCOUNTER — ANESTHESIA EVENT (OUTPATIENT)
Dept: ENDOSCOPY | Age: 32
DRG: 432 | End: 2017-06-01
Payer: SELF-PAY

## 2017-06-01 ENCOUNTER — ANESTHESIA (OUTPATIENT)
Dept: ENDOSCOPY | Age: 32
DRG: 432 | End: 2017-06-01
Payer: SELF-PAY

## 2017-06-01 LAB
ANION GAP BLD CALC-SCNC: 12 MMOL/L (ref 5–15)
BASOPHILS # BLD AUTO: 0 K/UL (ref 0–0.1)
BASOPHILS # BLD: 0 % (ref 0–1)
BUN SERPL-MCNC: 12 MG/DL (ref 6–20)
BUN/CREAT SERPL: 18 (ref 12–20)
CALCIUM SERPL-MCNC: 8 MG/DL (ref 8.5–10.1)
CHLORIDE SERPL-SCNC: 99 MMOL/L (ref 97–108)
CO2 SERPL-SCNC: 25 MMOL/L (ref 21–32)
CREAT SERPL-MCNC: 0.65 MG/DL (ref 0.7–1.3)
EOSINOPHIL # BLD: 0 K/UL (ref 0–0.4)
EOSINOPHIL NFR BLD: 0 % (ref 0–7)
ERYTHROCYTE [DISTWIDTH] IN BLOOD BY AUTOMATED COUNT: 12.8 % (ref 11.5–14.5)
GLUCOSE SERPL-MCNC: 87 MG/DL (ref 65–100)
HCT VFR BLD AUTO: 39 % (ref 36.6–50.3)
HGB BLD-MCNC: 13.8 G/DL (ref 12.1–17)
LYMPHOCYTES # BLD AUTO: 21 % (ref 12–49)
LYMPHOCYTES # BLD: 1.7 K/UL (ref 0.8–3.5)
MAGNESIUM SERPL-MCNC: 1.6 MG/DL (ref 1.6–2.4)
MCH RBC QN AUTO: 31.2 PG (ref 26–34)
MCHC RBC AUTO-ENTMCNC: 35.4 G/DL (ref 30–36.5)
MCV RBC AUTO: 88 FL (ref 80–99)
MONOCYTES # BLD: 0.8 K/UL (ref 0–1)
MONOCYTES NFR BLD AUTO: 9 % (ref 5–13)
NEUTS SEG # BLD: 5.8 K/UL (ref 1.8–8)
NEUTS SEG NFR BLD AUTO: 70 % (ref 32–75)
PHOSPHATE SERPL-MCNC: 2.1 MG/DL (ref 2.6–4.7)
PLATELET # BLD AUTO: 163 K/UL (ref 150–400)
POTASSIUM SERPL-SCNC: 3.6 MMOL/L (ref 3.5–5.1)
RBC # BLD AUTO: 4.43 M/UL (ref 4.1–5.7)
SODIUM SERPL-SCNC: 136 MMOL/L (ref 136–145)
TSH SERPL DL<=0.05 MIU/L-ACNC: 3.35 UIU/ML (ref 0.36–3.74)
WBC # BLD AUTO: 8.2 K/UL (ref 4.1–11.1)

## 2017-06-01 PROCEDURE — 74011250636 HC RX REV CODE- 250/636

## 2017-06-01 PROCEDURE — 0DB68ZX EXCISION OF STOMACH, VIA NATURAL OR ARTIFICIAL OPENING ENDOSCOPIC, DIAGNOSTIC: ICD-10-PCS | Performed by: INTERNAL MEDICINE

## 2017-06-01 PROCEDURE — 65270000029 HC RM PRIVATE

## 2017-06-01 PROCEDURE — 85025 COMPLETE CBC W/AUTO DIFF WBC: CPT | Performed by: INTERNAL MEDICINE

## 2017-06-01 PROCEDURE — 84100 ASSAY OF PHOSPHORUS: CPT | Performed by: INTERNAL MEDICINE

## 2017-06-01 PROCEDURE — 74011250636 HC RX REV CODE- 250/636: Performed by: INTERNAL MEDICINE

## 2017-06-01 PROCEDURE — 74011000250 HC RX REV CODE- 250: Performed by: FAMILY MEDICINE

## 2017-06-01 PROCEDURE — 83735 ASSAY OF MAGNESIUM: CPT | Performed by: INTERNAL MEDICINE

## 2017-06-01 PROCEDURE — 84443 ASSAY THYROID STIM HORMONE: CPT | Performed by: INTERNAL MEDICINE

## 2017-06-01 PROCEDURE — C9113 INJ PANTOPRAZOLE SODIUM, VIA: HCPCS | Performed by: FAMILY MEDICINE

## 2017-06-01 PROCEDURE — 74011250636 HC RX REV CODE- 250/636: Performed by: FAMILY MEDICINE

## 2017-06-01 PROCEDURE — 76040000019: Performed by: INTERNAL MEDICINE

## 2017-06-01 PROCEDURE — 99218 HC RM OBSERVATION: CPT

## 2017-06-01 PROCEDURE — 76060000031 HC ANESTHESIA FIRST 0.5 HR: Performed by: INTERNAL MEDICINE

## 2017-06-01 PROCEDURE — 74011250636 HC RX REV CODE- 250/636: Performed by: HOSPITALIST

## 2017-06-01 PROCEDURE — 80048 BASIC METABOLIC PNL TOTAL CA: CPT | Performed by: INTERNAL MEDICINE

## 2017-06-01 PROCEDURE — 36415 COLL VENOUS BLD VENIPUNCTURE: CPT | Performed by: INTERNAL MEDICINE

## 2017-06-01 PROCEDURE — 74011000250 HC RX REV CODE- 250

## 2017-06-01 PROCEDURE — 88305 TISSUE EXAM BY PATHOLOGIST: CPT | Performed by: INTERNAL MEDICINE

## 2017-06-01 PROCEDURE — 88342 IMHCHEM/IMCYTCHM 1ST ANTB: CPT | Performed by: INTERNAL MEDICINE

## 2017-06-01 PROCEDURE — 77030009426 HC FCPS BIOP ENDOSC BSC -B: Performed by: INTERNAL MEDICINE

## 2017-06-01 RX ORDER — PANTOPRAZOLE SODIUM 40 MG/1
40 TABLET, DELAYED RELEASE ORAL
Status: DISCONTINUED | OUTPATIENT
Start: 2017-06-02 | End: 2017-06-05 | Stop reason: HOSPADM

## 2017-06-01 RX ORDER — LORAZEPAM 1 MG/1
2 TABLET ORAL
Status: DISCONTINUED | OUTPATIENT
Start: 2017-06-01 | End: 2017-06-05 | Stop reason: HOSPADM

## 2017-06-01 RX ORDER — PROPOFOL 10 MG/ML
INJECTION, EMULSION INTRAVENOUS AS NEEDED
Status: DISCONTINUED | OUTPATIENT
Start: 2017-06-01 | End: 2017-06-01 | Stop reason: HOSPADM

## 2017-06-01 RX ORDER — MORPHINE SULFATE 2 MG/ML
7 INJECTION, SOLUTION INTRAMUSCULAR; INTRAVENOUS
Status: DISCONTINUED | OUTPATIENT
Start: 2017-06-01 | End: 2017-06-03

## 2017-06-01 RX ORDER — LIDOCAINE HYDROCHLORIDE 20 MG/ML
INJECTION, SOLUTION EPIDURAL; INFILTRATION; INTRACAUDAL; PERINEURAL AS NEEDED
Status: DISCONTINUED | OUTPATIENT
Start: 2017-06-01 | End: 2017-06-01 | Stop reason: HOSPADM

## 2017-06-01 RX ORDER — SODIUM CHLORIDE 9 MG/ML
INJECTION, SOLUTION INTRAVENOUS
Status: DISCONTINUED | OUTPATIENT
Start: 2017-06-01 | End: 2017-06-01 | Stop reason: HOSPADM

## 2017-06-01 RX ORDER — LORAZEPAM 2 MG/ML
4 INJECTION INTRAMUSCULAR
Status: DISCONTINUED | OUTPATIENT
Start: 2017-06-01 | End: 2017-06-05 | Stop reason: HOSPADM

## 2017-06-01 RX ORDER — MORPHINE SULFATE 2 MG/ML
5 INJECTION, SOLUTION INTRAMUSCULAR; INTRAVENOUS
Status: DISCONTINUED | OUTPATIENT
Start: 2017-06-01 | End: 2017-06-01

## 2017-06-01 RX ADMIN — PROPOFOL 50 MG: 10 INJECTION, EMULSION INTRAVENOUS at 11:09

## 2017-06-01 RX ADMIN — Medication 5 MG: at 15:04

## 2017-06-01 RX ADMIN — SODIUM CHLORIDE: 9 INJECTION, SOLUTION INTRAVENOUS at 11:02

## 2017-06-01 RX ADMIN — PROPOFOL 50 MG: 10 INJECTION, EMULSION INTRAVENOUS at 11:05

## 2017-06-01 RX ADMIN — Medication 7 MG: at 18:36

## 2017-06-01 RX ADMIN — LORAZEPAM 4 MG: 2 INJECTION INTRAMUSCULAR; INTRAVENOUS at 16:35

## 2017-06-01 RX ADMIN — HYDROMORPHONE HYDROCHLORIDE 1 MG: 1 INJECTION, SOLUTION INTRAMUSCULAR; INTRAVENOUS; SUBCUTANEOUS at 03:24

## 2017-06-01 RX ADMIN — PROPOFOL 50 MG: 10 INJECTION, EMULSION INTRAVENOUS at 11:02

## 2017-06-01 RX ADMIN — PROPOFOL 50 MG: 10 INJECTION, EMULSION INTRAVENOUS at 11:13

## 2017-06-01 RX ADMIN — Medication 7 MG: at 21:47

## 2017-06-01 RX ADMIN — SODIUM CHLORIDE, SODIUM LACTATE, POTASSIUM CHLORIDE, AND CALCIUM CHLORIDE 250 ML/HR: 600; 310; 30; 20 INJECTION, SOLUTION INTRAVENOUS at 08:45

## 2017-06-01 RX ADMIN — PROPOFOL 30 MG: 10 INJECTION, EMULSION INTRAVENOUS at 11:03

## 2017-06-01 RX ADMIN — Medication 5 MG: at 08:32

## 2017-06-01 RX ADMIN — PROPOFOL 50 MG: 10 INJECTION, EMULSION INTRAVENOUS at 11:07

## 2017-06-01 RX ADMIN — LIDOCAINE HYDROCHLORIDE 100 MG: 20 INJECTION, SOLUTION EPIDURAL; INFILTRATION; INTRACAUDAL; PERINEURAL at 11:02

## 2017-06-01 RX ADMIN — SODIUM CHLORIDE 40 MG: 9 INJECTION INTRAMUSCULAR; INTRAVENOUS; SUBCUTANEOUS at 08:31

## 2017-06-01 RX ADMIN — PROPOFOL 50 MG: 10 INJECTION, EMULSION INTRAVENOUS at 11:11

## 2017-06-01 RX ADMIN — FOLIC ACID: 5 INJECTION, SOLUTION INTRAMUSCULAR; INTRAVENOUS; SUBCUTANEOUS at 12:18

## 2017-06-01 RX ADMIN — PROPOFOL 50 MG: 10 INJECTION, EMULSION INTRAVENOUS at 11:04

## 2017-06-01 RX ADMIN — Medication 10 ML: at 21:47

## 2017-06-01 RX ADMIN — ONDANSETRON 4 MG: 2 INJECTION INTRAMUSCULAR; INTRAVENOUS at 13:26

## 2017-06-01 RX ADMIN — Medication 5 MG: at 12:14

## 2017-06-01 RX ADMIN — HYDROMORPHONE HYDROCHLORIDE 1 MG: 1 INJECTION, SOLUTION INTRAMUSCULAR; INTRAVENOUS; SUBCUTANEOUS at 06:03

## 2017-06-01 RX ADMIN — Medication 10 ML: at 13:26

## 2017-06-01 RX ADMIN — HYDROMORPHONE HYDROCHLORIDE 1 MG: 1 INJECTION, SOLUTION INTRAMUSCULAR; INTRAVENOUS; SUBCUTANEOUS at 00:59

## 2017-06-01 NOTE — ROUTINE PROCESS
[facility]  1985  [id number]    Situation:  Verbal report received from: DALLIN Franco  Procedure: Procedure(s) with comments:  ESOPHAGOGASTRODUODENOSCOPY (EGD) - egd  ESOPHAGOGASTRODUODENAL (EGD) BIOPSY    Background:    Preoperative diagnosis: gi bleed  Postoperative diagnosis: Gastritis    :  Dr. Nai Lora  Assistant(s): Endoscopy Technician-1: Kota Boyle IV  Endoscopy RN-1: Emir Lambert RN    Specimens:   ID Type Source Tests Collected by Time Destination   1 : Gastric bx Preservative   Sanjana Cutler MD 6/1/2017 1112 Pathology     H. Pylori  no    Assessment:  Intra-procedure medications       Anesthesia gave intra-procedure sedation and medications, see anesthesia flow sheet yes    Intravenous fluids:  300  NS @ KVO     Vital signs stable yes    Abdominal assessment: round and soft yes    Recommendation:  Discharge patient per MD order NO.   Return to floor YES  Family or Friend : none  Permission to share finding with family or friend yes and n/a

## 2017-06-01 NOTE — ROUTINE PROCESS
Primary Nurse Keily Yeager RN and DALLIN Mota performed a dual skin assessment on this patient No impairment noted  Mark Anthony score is 22

## 2017-06-01 NOTE — PROGRESS NOTES
Problem: Discharge Planning  Goal: *Discharge to safe environment  Outcome: Progressing Towards Goal  Progressing toward goal.

## 2017-06-01 NOTE — PROGRESS NOTES
SBAR report received from [facility], [address] [facility]

## 2017-06-01 NOTE — PROCEDURES
[facility] [address] [facility] 09, [address] [facility]  [phone]               Esophagogastroduodenoscopy (EGD) Procedure Note    NAME: Ivon Calvin  :  1985  MRN:  [id number]    Indications:  Hematemesis     : Dali Todd MD    Referring Provider:  None    Medicine:  MAC anesthesia      Procedure Details:  After informed consent was obtained with all risks and benefits of the procedure explained and preprocedure exam completed, the patient was placed in the left lateral decubitus position. Universal protocol for patient identification was performed and documented in the nursing notes. Throughout the procedure, the patient's blood pressure was monitored at least every five minutes; pulse, and oxygen saturations were monitored continuously. All vital signs were documented in the nursing notes. The endoscope was inserted into the mouth and advanced under direct vision to second portion of the duodenum. A careful inspection was made as the gastroscope was withdrawn, including a retroflexed view of the proximal stomach; findings and interventions are described below. Findings:   Esophagus:normal  Stomach: moderate erythema throughout s/p biopsies  Duodenum: normal    Interventions:    biopsy of stomach whole    Specimens:     ID Type Source Tests Collected by Time Destination   1 : Gastric bx Preservative   Dali Todd MD 2017 1112 Pathology        EBL: None          Complications:     No immediate complications        Impression:  -As above. No blood throughout the procedure. Recommendations:  -Await pathology. -PPI daily  -Start low fat CLD    Signed by:  Dali Todd MD         2017 11:15 AM

## 2017-06-01 NOTE — ROUTINE PROCESS
TRANSFER - OUT REPORT:    Verbal report given to DALLIN Ramachandran  on Sameer Olivo  being transferred to Room 548  for routine progression of care       Report consisted of patients Situation, Background, Assessment and   Recommendations(SBAR). Information from the following report(s) Procedure Summary was reviewed with the receiving nurse. Lines:   Peripheral IV 05/31/17 Left Antecubital (Active)   Site Assessment Clean, dry, & intact 6/1/2017 10:32 AM   Phlebitis Assessment 0 6/1/2017 10:32 AM   Infiltration Assessment 0 6/1/2017 10:32 AM   Dressing Status Clean, dry, & intact 6/1/2017 10:32 AM   Dressing Type Tape;Transparent 6/1/2017 10:32 AM   Hub Color/Line Status Pink; Infusing 6/1/2017 10:32 AM   Action Taken Open ports on tubing capped 6/1/2017  8:17 AM   Alcohol Cap Used Yes 6/1/2017  8:17 AM        Opportunity for questions and clarification was provided.       Jason Richard RN

## 2017-06-01 NOTE — PROGRESS NOTES
Care Management Interventions  PCP Verified by CM: No (None)  Mode of Transport at Discharge: Other (see comment)  Transition of Care Consult (CM Consult):  (None.)  MyChart Signup: No  Discharge Durable Medical Equipment: No  Physical Therapy Consult: No  Occupational Therapy Consult: No  Speech Therapy Consult: No  Current Support Network: Other  Confirm Follow Up Transport: Friends  Plan discussed with Pt/Family/Caregiver: Yes  Freedom of Choice Offered:  (N/A)  Discharge Location  Discharge Placement: Home    CM met with patient. Patient lives has lived with his employer and 3 co-workers from [place] for the past 2 months. He previously lived with his father in [facility] and plans to return there. Patient said that he has \"lost his green card and social security card. \" He also said that his [place] 's license has  due to [employer]. \" Patient does not have health insurance or PCP. Transport to residence will be provided by friends. CM provided resource information for outpatient alcohol treatment with the [address] [place] [address]) as well as the Alcoholic Anonymous hotline number and web sites to obtain meeting locations (patient access Internet on his cell phone). CM to follow for discharge planning needs.

## 2017-06-01 NOTE — PROGRESS NOTES
Latasha Nixon MD   Phone/text: [phone] (7am to 7pm)  After 7pm please call  for hospitalist on call    Hospitalist Progress Note     6/1/2017   PCP:  Dr. Fatmata Lopez   Patient presents with    Abdominal Pain       Admission Summary:   The patient is a 28-year-old male with past medical history of pancreatitis, alcohol abuse, hypertension, who presents to the hospital with abdominal pain. Reason For Visit:  Abdominal pain    Assessment/Plan   Acute pancreatitis with likely pseudocyst (POA) - likely due to alcohol  - CT abdomen 5/31 with peripancreatic inflammation, walled off mass anterior to the pancrease, moderate hepatic steatosis  - RUQ US 5/31 with mild to moderate hepatic steatosis. - NPO  - IV fluids  - Pain control/antiemetics    GI bleed with hematemesis in the setting of vomiting (POA) - suspected MW tear  - PPI  - NPO  - Monitor HH  - GI consulted - plan for EGD    Severe Hypertriglyceridemia (POA) - new dx, probably not the sole cause of pancreatitis but certainly not helping. Could be due to EtOH  - Check A1c, TSH  - No heparin due to GI bleed  - Consider insulin drip if not improving with fluids    EtOH abuse (chronic) - sounds like binge drinking but monitoring for withdrawal none-the-less  - CIWA with ativan as needed    Hypokalemia (POA) - monitor and replace as needed    Bilateral parotid gland swelling - possibly from vomiting    See orders for other plans. VTE prophylaxis: SCDs  Discussed plan of care with Patient/Family and Nurse   Pre-admission lived at home  Discharge plan: home  Estimated time to discharge: 2-3 days     Subjective   Mr. Chin Mcintyre is feeling a little better with the changes to his pain medication. Pain is still sharp, epigastric, radiating to his back. Accompanied by nausea, vomiting but these are also better with medication.     Reviewed interval history  Physical examination     Visit Vitals    BP (!) 157/101    Pulse 91  Temp 98.3 °F (36.8 °C)    Resp 18    Ht 5' 6\" (1.676 m)    Wt 83.9 kg (185 lb)    SpO2 96%    BMI 29.86 kg/m2       Temp (24hrs), Av °F (36.7 °C), Min:97.8 °F (36.6 °C), Max:98.3 °F (36.8 °C)      Intake/Output Summary (Last 24 hours) at 17 0910  Last data filed at 17 0102   Gross per 24 hour   Intake                0 ml   Output             1150 ml   Net            -1150 ml       Gen - NAD  HEENT - MMM, bilateral parotid gland swelling  Neck - supple, full ROM  CV - RRR, no MRG  Resp - lungs CTA b/l, no wheezing, normal WOB  GI - abdomen S, tender in epigastrium, ND, +BS.  - no CVA tenderness, bladder non-palpable in lower abdomen  MSK - normal tone and bulk, no edema  Neuro - A&O, no focal deficits  Psych - calm and cooperative with normal affect    Data Review       Telemetry    ECG    Xray    CT scan x   MRI    Echocardiogram    Ultrasound x             I have reviewed the flow sheet and recent notes  New labs and data personally reviewed.     Recent Labs      17   0339  17   1705  17   1238   WBC  8.2   --   6.9   HGB  13.8  15.8  17.5*   HCT  39.0  43.0  47.3   PLT  163   --   222     Recent Labs      17   0339  17   1705  17   1238   NA  136   --   138   K  3.6   --   3.3*   CL  99   --   100   CO2  25   --   24   GLU  87   --   117*   BUN  12   --   14   CREA  0.65*   --   0.96   CA  8.0*   --   8.1*   MG  1.6  2.0   --    PHOS  2.1*   --    --    ALB   --    --   3.0*   TBILI   --    --   0.5   SGOT   --    --   158*   ALT   --    --   108*   INR   --    --   1.0       Medications reviewed  Current Facility-Administered Medications   Medication Dose Route Frequency    morphine injection 5 mg  5 mg IntraVENous Q3H PRN    sodium chloride (NS) flush 5-10 mL  5-10 mL IntraVENous Q8H    sodium chloride (NS) flush 5-10 mL  5-10 mL IntraVENous PRN    lactated ringers infusion  250 mL/hr IntraVENous CONTINUOUS    ondansetron (ZOFRAN) injection 4 mg 4 mg IntraVENous Q4H PRN    pantoprazole (PROTONIX) 40 mg in sodium chloride 0.9 % 10 mL injection  40 mg IntraVENous Q12H    LORazepam (ATIVAN) tablet 2 mg  2 mg Oral Q1H PRN    0.9% sodium chloride 8,083 mL with folic acid 1 mg, thiamine 100 mg, mvi, adult no. 4 with vit K 10 mL infusion   IntraVENous DAILY    hydrALAZINE (APRESOLINE) 20 mg/mL injection 10 mg  10 mg IntraVENous Q6H PRN    ketorolac (TORADOL) injection 15 mg  15 mg IntraVENous Q6H PRN         Alona Mulligan MD  Internal Medicine  6/1/2017

## 2017-06-01 NOTE — ANESTHESIA PREPROCEDURE EVALUATION
Anesthetic History   No history of anesthetic complications            Review of Systems / Medical History  Patient summary reviewed, nursing notes reviewed and pertinent labs reviewed    Pulmonary  Within defined limits                 Neuro/Psych             Comments: ETOH abuse Cardiovascular    Hypertension              Exercise tolerance: >4 METS     GI/Hepatic/Renal               Comments: GI bleed  Pancreatic pseudocyst Endo/Other  Within defined limits           Other Findings            Physical Exam    Airway  Mallampati: II  TM Distance: > 6 cm  Neck ROM: normal range of motion   Mouth opening: Normal     Cardiovascular    Rhythm: regular  Rate: normal         Dental  No notable dental hx       Pulmonary  Breath sounds clear to auscultation               Abdominal         Other Findings            Anesthetic Plan    ASA: 2  Anesthesia type: MAC          Induction: Intravenous  Anesthetic plan and risks discussed with: Patient

## 2017-06-01 NOTE — H&P
[address], [address]          Pre-procedure History and Physical       NAME:  Poornima Lai   :   1985   MRN:   [id number]     CHIEF COMPLAINT/HPI: See procedure note    PMH:  Past Medical History:   Diagnosis Date    ETOH abuse     Hypertension     Pancreatitis        PSH:  History reviewed. No pertinent surgical history. Allergies:  No Known Allergies    Home Medications:  Prior to Admission Medications   Prescriptions Last Dose Informant Patient Reported? Taking?   ibuprofen (ADVIL) 200 mg tablet   Yes Yes   Sig: Take 400-800 mg by mouth as needed for Pain. Patient takes 2-4 tablets (400-800 mg) occasionally as needed for pain. Facility-Administered Medications: None       Hospital Medications:  Current Facility-Administered Medications   Medication Dose Route Frequency    morphine injection 5 mg  5 mg IntraVENous Q3H PRN    LORazepam (ATIVAN) injection 4 mg  4 mg IntraVENous Q1H PRN    LORazepam (ATIVAN) tablet 2 mg  2 mg Oral Q1H PRN    sodium chloride (NS) flush 5-10 mL  5-10 mL IntraVENous Q8H    sodium chloride (NS) flush 5-10 mL  5-10 mL IntraVENous PRN    lactated ringers infusion  250 mL/hr IntraVENous CONTINUOUS    ondansetron (ZOFRAN) injection 4 mg  4 mg IntraVENous Q4H PRN    pantoprazole (PROTONIX) 40 mg in sodium chloride 0.9 % 10 mL injection  40 mg IntraVENous Q12H    0.9% sodium chloride 1,050 mL with folic acid 1 mg, thiamine 100 mg, mvi, adult no. 4 with vit K 10 mL infusion   IntraVENous DAILY    hydrALAZINE (APRESOLINE) 20 mg/mL injection 10 mg  10 mg IntraVENous Q6H PRN    ketorolac (TORADOL) injection 15 mg  15 mg IntraVENous Q6H PRN       Family History:  History reviewed. No pertinent family history.     Social History:  Social History   Substance Use Topics    Smoking status: Never Smoker    Smokeless tobacco: Not on file    Alcohol use Yes         PHYSICAL EXAM PRIOR TO SEDATION:  General: Alert, in no acute distress    Lungs:            CTA bilaterally  Heart:  Normal S1, S2    Abdomen: Soft, Non distended, Non tender. Normoactive bowel sounds. Assessment:   Stable for sedation administration.     Plan:   · Endoscopic procedure with sedation     Signed By: Sanjana Cutler MD     6/1/2017  11:02 AM

## 2017-06-02 LAB
ALBUMIN SERPL BCP-MCNC: 3.1 G/DL (ref 3.5–5)
ALBUMIN/GLOB SERPL: 0.7 {RATIO} (ref 1.1–2.2)
ALP SERPL-CCNC: 98 U/L (ref 45–117)
ALT SERPL-CCNC: 82 U/L (ref 12–78)
ANION GAP BLD CALC-SCNC: 9 MMOL/L (ref 5–15)
AST SERPL W P-5'-P-CCNC: 109 U/L (ref 15–37)
BILIRUB SERPL-MCNC: 1.2 MG/DL (ref 0.2–1)
BUN SERPL-MCNC: 3 MG/DL (ref 6–20)
BUN/CREAT SERPL: 5 (ref 12–20)
CALCIUM SERPL-MCNC: 8.8 MG/DL (ref 8.5–10.1)
CHLORIDE SERPL-SCNC: 95 MMOL/L (ref 97–108)
CO2 SERPL-SCNC: 29 MMOL/L (ref 21–32)
CREAT SERPL-MCNC: 0.59 MG/DL (ref 0.7–1.3)
GLOBULIN SER CALC-MCNC: 4.2 G/DL (ref 2–4)
GLUCOSE SERPL-MCNC: 87 MG/DL (ref 65–100)
LIPASE SERPL-CCNC: 1156 U/L (ref 73–393)
POTASSIUM SERPL-SCNC: 3.1 MMOL/L (ref 3.5–5.1)
PROT SERPL-MCNC: 7.3 G/DL (ref 6.4–8.2)
SODIUM SERPL-SCNC: 133 MMOL/L (ref 136–145)
TRIGL SERPL-MCNC: 183 MG/DL (ref ?–150)

## 2017-06-02 PROCEDURE — 74011000250 HC RX REV CODE- 250: Performed by: FAMILY MEDICINE

## 2017-06-02 PROCEDURE — 74011250637 HC RX REV CODE- 250/637: Performed by: HOSPITALIST

## 2017-06-02 PROCEDURE — 36415 COLL VENOUS BLD VENIPUNCTURE: CPT | Performed by: INTERNAL MEDICINE

## 2017-06-02 PROCEDURE — 74011250636 HC RX REV CODE- 250/636: Performed by: HOSPITALIST

## 2017-06-02 PROCEDURE — 74011250636 HC RX REV CODE- 250/636: Performed by: FAMILY MEDICINE

## 2017-06-02 PROCEDURE — 74011250637 HC RX REV CODE- 250/637: Performed by: INTERNAL MEDICINE

## 2017-06-02 PROCEDURE — 80053 COMPREHEN METABOLIC PANEL: CPT | Performed by: INTERNAL MEDICINE

## 2017-06-02 PROCEDURE — 65660000000 HC RM CCU STEPDOWN

## 2017-06-02 PROCEDURE — 84478 ASSAY OF TRIGLYCERIDES: CPT | Performed by: INTERNAL MEDICINE

## 2017-06-02 PROCEDURE — 83690 ASSAY OF LIPASE: CPT | Performed by: INTERNAL MEDICINE

## 2017-06-02 RX ORDER — POTASSIUM CHLORIDE 7.45 MG/ML
10 INJECTION INTRAVENOUS
Status: DISPENSED | OUTPATIENT
Start: 2017-06-02 | End: 2017-06-02

## 2017-06-02 RX ORDER — FACIAL-BODY WIPES
10 EACH TOPICAL DAILY PRN
Status: DISCONTINUED | OUTPATIENT
Start: 2017-06-02 | End: 2017-06-05 | Stop reason: HOSPADM

## 2017-06-02 RX ADMIN — Medication 7 MG: at 09:00

## 2017-06-02 RX ADMIN — POTASSIUM CHLORIDE 10 MEQ: 10 INJECTION, SOLUTION INTRAVENOUS at 09:56

## 2017-06-02 RX ADMIN — Medication 7 MG: at 12:02

## 2017-06-02 RX ADMIN — POTASSIUM CHLORIDE 10 MEQ: 10 INJECTION, SOLUTION INTRAVENOUS at 11:05

## 2017-06-02 RX ADMIN — Medication 7 MG: at 18:07

## 2017-06-02 RX ADMIN — HYDRALAZINE HYDROCHLORIDE 10 MG: 20 INJECTION INTRAMUSCULAR; INTRAVENOUS at 09:57

## 2017-06-02 RX ADMIN — Medication 7 MG: at 21:30

## 2017-06-02 RX ADMIN — FOLIC ACID: 5 INJECTION, SOLUTION INTRAMUSCULAR; INTRAVENOUS; SUBCUTANEOUS at 05:05

## 2017-06-02 RX ADMIN — Medication 7 MG: at 15:04

## 2017-06-02 RX ADMIN — Medication 10 ML: at 13:30

## 2017-06-02 RX ADMIN — Medication 7 MG: at 05:05

## 2017-06-02 RX ADMIN — LORAZEPAM 2 MG: 1 TABLET ORAL at 20:58

## 2017-06-02 RX ADMIN — PANTOPRAZOLE SODIUM 40 MG: 40 TABLET, DELAYED RELEASE ORAL at 09:55

## 2017-06-02 RX ADMIN — POTASSIUM CHLORIDE 10 MEQ: 10 INJECTION, SOLUTION INTRAVENOUS at 11:58

## 2017-06-02 RX ADMIN — FOLIC ACID: 5 INJECTION, SOLUTION INTRAMUSCULAR; INTRAVENOUS; SUBCUTANEOUS at 09:57

## 2017-06-02 RX ADMIN — Medication 7 MG: at 00:49

## 2017-06-02 NOTE — PROGRESS NOTES
[facility] [address]so M.D.  [phone]               GASTROENTEROLOGY PROGRESS NOTE        NAME: Imelda De La Vega   :  1985   MRN:  [id number]       Subjective:   Pt tried clear liquid diet with resultant vomiting yesterday. Using Morphine regularly. EGD with gastric erythema. Objective:   VITALS:   Last 24hrs VS reviewed. Most recent are:  Visit Vitals    BP (!) 160/97 (BP 1 Location: Right arm, BP Patient Position: At rest;Head of bed elevated (Comment degrees))    Pulse 71    Temp 98.1 °F (36.7 °C)    Resp 18    Ht 5' 6\" (1.676 m)    Wt 83.9 kg (185 lb)    SpO2 97%    BMI 29.86 kg/m2       Intake/Output Summary (Last 24 hours) at 17 0749  Last data filed at 17 1146   Gross per 24 hour   Intake              420 ml   Output                0 ml   Net              420 ml       PHYSICAL EXAM:  General: Alert, in no acute distress    HEENT: Anicteric conjunctiva. Lungs:            CTA Bilaterally  Heart:  Normal S1, S2    Abdomen: Soft, Non distended,  Epigastric LLQ tender. Slightly decreased bowel sounds, no HSM,   no rebound/invol guarding  MSK:   Normal muscle tone  Skin:   Warm to touch  Psych:   Good insight. Not anxious nor agitated.     Lab Data Reviewed:   Recent Labs      17   03317   1705  17   1238   WBC  8.2   --   6.9   HGB  13.8  15.8  17.5*   HCT  39.0  43.0  47.3   PLT  163   --   222     Recent Labs      17   0517   0339  17   1705   NA  133*  136   --    K  3.1*  3.6   --    CL  95*  99   --    CO2  29  25   --    BUN  3*  12   --    CREA  0.59*  0.65*   --    GLU  87  87   --    CA  8.8  8.0*   --    MG   --   1.6  2.0   PHOS   --   2.1*   --      Recent Labs      17   0513  17   1238   SGOT  109*  158*   AP  98  130*   TP  7.3  7.9   ALB  3.1*  3.0*   GLOB  4.2*  4.9*   LPSE  1156*  1089*     Recent Labs      17   1238   INR  1.0   PTP  10.3   APTT  25.4      No results for input(s): FE, TIBC, PSAT, FERR in the last 72 hours. No results for input(s): CPK, CKMB in the last 72 hours. No lab exists for component: HILLARY    See Electronic Medical Record for all procedure/radiology reports and details which were not copied into this note but were reviewed prior to the creation of the Plan. Assessment:   · EtOH induced pancreatitis. Possible component of triglycerides which are much improved with IVFs  · Gastric erythema likely secondary to EtOH abuse. No signs of active bleeding. Patient Active Problem List   Diagnosis Code    Pancreatitis K85.90       Plan:   · NPO with sips of clears this evening  · IVFs  · Pain control  · PPI  · Weekend coverage to follow       Signed by:  Gena Villalobos MD         6/2/2017  7:49 AM

## 2017-06-02 NOTE — PROGRESS NOTES
Oumou Lang MD   Phone/text: [phone] (7am to 7pm)  After 7pm please call  for hospitalist on call    Hospitalist Progress Note     6/2/2017   PCP:  Dr. Chevis Baumgarten   Patient presents with    Abdominal Pain       Admission Summary:   The patient is a 29-year-old male with past medical history of pancreatitis, alcohol abuse, hypertension, who presents to the hospital with abdominal pain. Reason For Visit:  Abdominal pain    Assessment/Plan   Acute pancreatitis with likely pseudocyst (POA) - due to alcohol, did not tolerate clears yesterday  - CT abdomen 5/31 with peripancreatic inflammation, walled off mass anterior to the pancrease, moderate hepatic steatosis  - RUQ US 5/31 with mild to moderate hepatic steatosis. - NPO  - IV fluids  - Pain control/antiemetics    GI bleed with hematemesis in the setting of vomiting (POA) - EtOH induced irritation of the stomach  - EGD 6/1 with moderate stomach erythema  - PPI  - NPO  - Monitor HH  - GI consulted    Elevated LFTs - suspect an element of acute alcoholic hepatitis, PAIGE low, no signs of obstruction on imagining above, continue to monitor    Severe Hypertriglyceridemia (POA) - likely due to EtOH, resolved with fluids  - Checking A1c, TSH wnl    EtOH abuse (chronic) - sounds like binge drinking but monitoring for withdrawal none-the-less  - CIWA with ativan as needed    Hypokalemia (POA) - monitor and replace as needed    Bilateral parotid gland swelling - possibly from vomiting    See orders for other plans. VTE prophylaxis: SCDs  Discussed plan of care with Patient/Family and Nurse   Pre-admission lived at home  Discharge plan: home  Estimated time to discharge: 2-3 days     Subjective   Mr. Wiliam Burden tried a few sips of clears last night but reports that as soon as it hit his stomach, his pain worsened and he vomited. He is a bit better the morning but still with abdominal pain and nausea.     Reviewed interval history  Physical examination     Visit Vitals    BP (!) 160/97 (BP 1 Location: Right arm, BP Patient Position: At rest;Head of bed elevated (Comment degrees))    Pulse 71    Temp 98.1 °F (36.7 °C)    Resp 18    Ht 5' 6\" (1.676 m)    Wt 83.9 kg (185 lb)    SpO2 97%    BMI 29.86 kg/m2       Temp (24hrs), Av.3 °F (36.8 °C), Min:98.1 °F (36.7 °C), Max:98.5 °F (36.9 °C)      Intake/Output Summary (Last 24 hours) at 17 0800  Last data filed at 17 1146   Gross per 24 hour   Intake              420 ml   Output                0 ml   Net              420 ml       Gen - NAD  HEENT - MMM, bilateral parotid gland swelling  Neck - supple, full ROM  CV - RRR, no MRG  Resp - lungs CTA b/l, no wheezing, normal WOB  GI - abdomen S, tender in epigastrium, ND, +BS.  - no CVA tenderness, bladder non-palpable in lower abdomen  MSK - normal tone and bulk, no edema  Neuro - A&O, no focal deficits  Psych - calm and cooperative with normal affect    Data Review       Telemetry    ECG    Xray    CT scan    MRI    Echocardiogram    Ultrasound              I have reviewed the flow sheet and recent notes  New labs and data personally reviewed.     Recent Labs      17   0339  17   1705  17   1238   WBC  8.2   --   6.9   HGB  13.8  15.8  17.5*   HCT  39.0  43.0  47.3   PLT  163   --   222     Recent Labs      17   0513  17   0339  17   1705  17   1238   NA  133*  136   --   138   K  3.1*  3.6   --   3.3*   CL  95*  99   --   100   CO2  29  25   --   24   GLU  87  87   --   117*   BUN  3*  12   --   14   CREA  0.59*  0.65*   --   0.96   CA  8.8  8.0*   --   8.1*   MG   --   1.6  2.0   --    PHOS   --   2.1*   --    --    ALB  3.1*   --    --   3.0*   TBILI  1.2*   --    --   0.5   SGOT  109*   --    --   158*   ALT  82*   --    --   108*   INR   --    --    --   1.0       Medications reviewed  Current Facility-Administered Medications   Medication Dose Route Frequency    potassium chloride 10 mEq in 100 ml IVPB  10 mEq IntraVENous Q1H    LORazepam (ATIVAN) injection 4 mg  4 mg IntraVENous Q1H PRN    LORazepam (ATIVAN) tablet 2 mg  2 mg Oral Q1H PRN    pantoprazole (PROTONIX) tablet 40 mg  40 mg Oral ACB    morphine injection 7 mg  7 mg IntraVENous Q3H PRN    sodium chloride (NS) flush 5-10 mL  5-10 mL IntraVENous Q8H    sodium chloride (NS) flush 5-10 mL  5-10 mL IntraVENous PRN    lactated ringers infusion  250 mL/hr IntraVENous CONTINUOUS    ondansetron (ZOFRAN) injection 4 mg  4 mg IntraVENous Q4H PRN    0.9% sodium chloride 3,592 mL with folic acid 1 mg, thiamine 100 mg, mvi, adult no. 4 with vit K 10 mL infusion   IntraVENous DAILY    hydrALAZINE (APRESOLINE) 20 mg/mL injection 10 mg  10 mg IntraVENous Q6H PRN         Karina Cavazos MD  Internal Medicine  6/2/2017

## 2017-06-02 NOTE — PROGRESS NOTES
Bedside and Verbal shift change report given to Miriam Velazquez RN (oncoming nurse) by Charlie Fisher RN (offgoing nurse). Report included the following information SBAR, Kardex, MAR and Recent Results.

## 2017-06-03 LAB
ALBUMIN SERPL BCP-MCNC: 3 G/DL (ref 3.5–5)
ALBUMIN/GLOB SERPL: 0.7 {RATIO} (ref 1.1–2.2)
ALP SERPL-CCNC: 92 U/L (ref 45–117)
ALT SERPL-CCNC: 81 U/L (ref 12–78)
ANION GAP BLD CALC-SCNC: 12 MMOL/L (ref 5–15)
AST SERPL W P-5'-P-CCNC: 87 U/L (ref 15–37)
BILIRUB SERPL-MCNC: 0.8 MG/DL (ref 0.2–1)
BUN SERPL-MCNC: 4 MG/DL (ref 6–20)
BUN/CREAT SERPL: 7 (ref 12–20)
CALCIUM SERPL-MCNC: 9 MG/DL (ref 8.5–10.1)
CHLORIDE SERPL-SCNC: 97 MMOL/L (ref 97–108)
CO2 SERPL-SCNC: 26 MMOL/L (ref 21–32)
CREAT SERPL-MCNC: 0.58 MG/DL (ref 0.7–1.3)
ERYTHROCYTE [DISTWIDTH] IN BLOOD BY AUTOMATED COUNT: 12.7 % (ref 11.5–14.5)
GLOBULIN SER CALC-MCNC: 4.4 G/DL (ref 2–4)
GLUCOSE BLD STRIP.AUTO-MCNC: 71 MG/DL (ref 65–100)
GLUCOSE BLD STRIP.AUTO-MCNC: 76 MG/DL (ref 65–100)
GLUCOSE BLD STRIP.AUTO-MCNC: 83 MG/DL (ref 65–100)
GLUCOSE BLD STRIP.AUTO-MCNC: 95 MG/DL (ref 65–100)
GLUCOSE SERPL-MCNC: 70 MG/DL (ref 65–100)
HCT VFR BLD AUTO: 40.1 % (ref 36.6–50.3)
HGB BLD-MCNC: 14.3 G/DL (ref 12.1–17)
LIPASE SERPL-CCNC: 692 U/L (ref 73–393)
MCH RBC QN AUTO: 31.2 PG (ref 26–34)
MCHC RBC AUTO-ENTMCNC: 35.7 G/DL (ref 30–36.5)
MCV RBC AUTO: 87.4 FL (ref 80–99)
PLATELET # BLD AUTO: 152 K/UL (ref 150–400)
POTASSIUM SERPL-SCNC: 3.3 MMOL/L (ref 3.5–5.1)
PROT SERPL-MCNC: 7.4 G/DL (ref 6.4–8.2)
RBC # BLD AUTO: 4.59 M/UL (ref 4.1–5.7)
SERVICE CMNT-IMP: NORMAL
SODIUM SERPL-SCNC: 135 MMOL/L (ref 136–145)
WBC # BLD AUTO: 4.3 K/UL (ref 4.1–11.1)

## 2017-06-03 PROCEDURE — 85027 COMPLETE CBC AUTOMATED: CPT | Performed by: INTERNAL MEDICINE

## 2017-06-03 PROCEDURE — 74011250637 HC RX REV CODE- 250/637: Performed by: INTERNAL MEDICINE

## 2017-06-03 PROCEDURE — 82787 IGG 1 2 3 OR 4 EACH: CPT | Performed by: HOSPITALIST

## 2017-06-03 PROCEDURE — 74011000250 HC RX REV CODE- 250: Performed by: HOSPITALIST

## 2017-06-03 PROCEDURE — 74011250636 HC RX REV CODE- 250/636: Performed by: HOSPITALIST

## 2017-06-03 PROCEDURE — 83690 ASSAY OF LIPASE: CPT | Performed by: HOSPITALIST

## 2017-06-03 PROCEDURE — 74011250637 HC RX REV CODE- 250/637: Performed by: HOSPITALIST

## 2017-06-03 PROCEDURE — 82962 GLUCOSE BLOOD TEST: CPT

## 2017-06-03 PROCEDURE — 65660000000 HC RM CCU STEPDOWN

## 2017-06-03 PROCEDURE — 74011250636 HC RX REV CODE- 250/636: Performed by: FAMILY MEDICINE

## 2017-06-03 PROCEDURE — 80053 COMPREHEN METABOLIC PANEL: CPT | Performed by: INTERNAL MEDICINE

## 2017-06-03 PROCEDURE — 36415 COLL VENOUS BLD VENIPUNCTURE: CPT | Performed by: INTERNAL MEDICINE

## 2017-06-03 PROCEDURE — 74011250636 HC RX REV CODE- 250/636: Performed by: INTERNAL MEDICINE

## 2017-06-03 PROCEDURE — 74011000250 HC RX REV CODE- 250: Performed by: FAMILY MEDICINE

## 2017-06-03 RX ORDER — POLYVINYL ALCOHOL 14 MG/ML
1 SOLUTION/ DROPS OPHTHALMIC AS NEEDED
Status: DISCONTINUED | OUTPATIENT
Start: 2017-06-03 | End: 2017-06-05 | Stop reason: HOSPADM

## 2017-06-03 RX ORDER — MORPHINE SULFATE 4 MG/ML
10 INJECTION, SOLUTION INTRAMUSCULAR; INTRAVENOUS
Status: DISCONTINUED | OUTPATIENT
Start: 2017-06-03 | End: 2017-06-05 | Stop reason: HOSPADM

## 2017-06-03 RX ORDER — AMOXICILLIN 250 MG
1 CAPSULE ORAL 2 TIMES DAILY
Status: DISCONTINUED | OUTPATIENT
Start: 2017-06-03 | End: 2017-06-05 | Stop reason: HOSPADM

## 2017-06-03 RX ORDER — POLYETHYLENE GLYCOL 3350 17 G/17G
17 POWDER, FOR SOLUTION ORAL
Status: DISCONTINUED | OUTPATIENT
Start: 2017-06-03 | End: 2017-06-05 | Stop reason: HOSPADM

## 2017-06-03 RX ADMIN — Medication 10 ML: at 00:24

## 2017-06-03 RX ADMIN — PANTOPRAZOLE SODIUM 40 MG: 40 TABLET, DELAYED RELEASE ORAL at 05:46

## 2017-06-03 RX ADMIN — LORAZEPAM 2 MG: 1 TABLET ORAL at 22:42

## 2017-06-03 RX ADMIN — LORAZEPAM 2 MG: 1 TABLET ORAL at 13:58

## 2017-06-03 RX ADMIN — ONDANSETRON 4 MG: 2 INJECTION INTRAMUSCULAR; INTRAVENOUS at 06:29

## 2017-06-03 RX ADMIN — Medication 7 MG: at 00:42

## 2017-06-03 RX ADMIN — Medication 10 MG: at 11:51

## 2017-06-03 RX ADMIN — FOLIC ACID: 5 INJECTION, SOLUTION INTRAMUSCULAR; INTRAVENOUS; SUBCUTANEOUS at 08:43

## 2017-06-03 RX ADMIN — Medication 10 ML: at 22:41

## 2017-06-03 RX ADMIN — POLYVINYL ALCOHOL 1 DROP: 14 SOLUTION/ DROPS OPHTHALMIC at 12:15

## 2017-06-03 RX ADMIN — Medication 7 MG: at 03:57

## 2017-06-03 RX ADMIN — SODIUM CHLORIDE, SODIUM LACTATE, POTASSIUM CHLORIDE, AND CALCIUM CHLORIDE 250 ML/HR: 600; 310; 30; 20 INJECTION, SOLUTION INTRAVENOUS at 00:23

## 2017-06-03 RX ADMIN — Medication 10 MG: at 21:17

## 2017-06-03 RX ADMIN — Medication 7 MG: at 08:43

## 2017-06-03 RX ADMIN — Medication 10 ML: at 06:31

## 2017-06-03 RX ADMIN — SODIUM CHLORIDE, SODIUM LACTATE, POTASSIUM CHLORIDE, AND CALCIUM CHLORIDE 100 ML/HR: 600; 310; 30; 20 INJECTION, SOLUTION INTRAVENOUS at 22:41

## 2017-06-03 RX ADMIN — HYDRALAZINE HYDROCHLORIDE 10 MG: 20 INJECTION INTRAMUSCULAR; INTRAVENOUS at 02:39

## 2017-06-03 RX ADMIN — DOCUSATE SODIUM AND SENNOSIDES 1 TABLET: 8.6; 5 TABLET, FILM COATED ORAL at 17:52

## 2017-06-03 RX ADMIN — LORAZEPAM 2 MG: 1 TABLET ORAL at 05:46

## 2017-06-03 RX ADMIN — Medication 10 ML: at 21:19

## 2017-06-03 RX ADMIN — DOCUSATE SODIUM AND SENNOSIDES 1 TABLET: 8.6; 5 TABLET, FILM COATED ORAL at 11:51

## 2017-06-03 RX ADMIN — Medication 10 MG: at 17:52

## 2017-06-03 RX ADMIN — Medication 10 MG: at 14:51

## 2017-06-03 NOTE — PROGRESS NOTES
Olena Marley MD   Phone/text: [phone] (7am to 7pm)  After 7pm please call  for hospitalist on call    Hospitalist Progress Note     6/3/2017   PCP:  Dr. Hank Chamberlain   Patient presents with    Abdominal Pain       Admission Summary:   The patient is a 78-year-old male with past medical history of pancreatitis, alcohol abuse, hypertension, who presents to the hospital with abdominal pain. Reason For Visit:  Abdominal pain    Assessment/Plan   Acute pancreatitis with likely pseudocyst (POA) - due to alcohol, a bit better today  - CT abdomen 5/31 with peripancreatic inflammation, walled off mass anterior to the pancrease, moderate hepatic steatosis  - RUQ US 5/31 with mild to moderate hepatic steatosis. - Trial of clear liquids today  - IV fluids  - Pain control/antiemetics    GI bleed with hematemesis in the setting of vomiting (POA) - EtOH induced irritation of the stomach, no further bleeding  - EGD 6/1 with moderate stomach erythema  - PPI  - Monitor HH  - GI consulted    Elevated LFTs - suspect an element of acute alcoholic hepatitis, MDF low, no signs of obstruction on imagining above, continue to monitor    Severe Hypertriglyceridemia (POA) - likely due to EtOH, resolved with fluids  - Checking A1c, TSH wnl    EtOH abuse (chronic) - sounds like binge drinking but monitoring for withdrawal none-the-less. CIWA scores are getting a little higher so we may be getting into the thick of it.  - CIWA with ativan as needed    Hypokalemia (POA) - monitor and replace as needed    Bilateral parotid gland swelling - possibly from vomiting    See orders for other plans. VTE prophylaxis: SCDs  Discussed plan of care with Patient/Family and Nurse   Pre-admission lived at home  Discharge plan: home  Estimated time to discharge: 2-3 days     Subjective   Mr. Xie Chill is a bit better. Pain is down but still bad. Nausea better even with sips.  Sad last night but he can't explain why.    Reviewed interval history  Physical examination     Visit Vitals    BP (!) 144/97 (BP 1 Location: Left arm, BP Patient Position: At rest)    Pulse 94    Temp 98.2 °F (36.8 °C)    Resp 18    Ht 5' 6\" (1.676 m)    Wt 83.9 kg (185 lb)    SpO2 96%    BMI 29.86 kg/m2       Temp (24hrs), Av.3 °F (36.8 °C), Min:98 °F (36.7 °C), Max:98.6 °F (37 °C)      Intake/Output Summary (Last 24 hours) at 17 1022  Last data filed at 17 0743   Gross per 24 hour   Intake              663 ml   Output             2850 ml   Net            -2187 ml       Gen - NAD  HEENT - MMM, bilateral parotid gland swelling  Neck - supple, full ROM  CV - RRR, no MRG  Resp - lungs CTA b/l, no wheezing, normal WOB  GI - abdomen S, tender in epigastrium, ND, +BS.  - no CVA tenderness, bladder non-palpable in lower abdomen  MSK - normal tone and bulk, no edema  Neuro - A&O, no focal deficits  Psych - calm and cooperative with normal affect    Data Review       Telemetry    ECG    Xray    CT scan    MRI    Echocardiogram    Ultrasound              I have reviewed the flow sheet and recent notes  New labs and data personally reviewed.     Recent Labs      17   0557  17   03317   17017   1238   WBC  4.3  8.2   --   6.9   HGB  14.3  13.8  15.8  17.5*   HCT  40.1  39.0  43.0  47.3   PLT  152  163   --   222     Recent Labs      17   0557  17   0513  17   0339  17   1705  17   1238   NA  135*  133*  136   --   138   K  3.3*  3.1*  3.6   --   3.3*   CL  97  95*  99   --   100   CO2  26  29  25   --   24   GLU  70  87  87   --   117*   BUN  4*  3*  12   --   14   CREA  0.58*  0.59*  0.65*   --   0.96   CA  9.0  8.8  8.0*   --   8.1*   MG   --    --   1.6  2.0   --    PHOS   --    --   2.1*   --    --    ALB  3.0*  3.1*   --    --   3.0*   TBILI  0.8  1.2*   --    --   0.5   SGOT  87*  109*   --    --   158*   ALT  81*  82*   --    --   108*   INR   --    --    --    --   1.0 Medications reviewed  Current Facility-Administered Medications   Medication Dose Route Frequency    morphine injection 10 mg  10 mg IntraVENous Q3H PRN    senna-docusate (PERICOLACE) 8.6-50 mg per tablet 1 Tab  1 Tab Oral BID    polyethylene glycol (MIRALAX) packet 17 g  17 g Oral DAILY PRN    bisacodyl (DULCOLAX) suppository 10 mg  10 mg Rectal DAILY PRN    LORazepam (ATIVAN) injection 4 mg  4 mg IntraVENous Q1H PRN    LORazepam (ATIVAN) tablet 2 mg  2 mg Oral Q1H PRN    pantoprazole (PROTONIX) tablet 40 mg  40 mg Oral ACB    sodium chloride (NS) flush 5-10 mL  5-10 mL IntraVENous Q8H    sodium chloride (NS) flush 5-10 mL  5-10 mL IntraVENous PRN    lactated ringers infusion  100 mL/hr IntraVENous CONTINUOUS    ondansetron (ZOFRAN) injection 4 mg  4 mg IntraVENous Q4H PRN    0.9% sodium chloride 8,764 mL with folic acid 1 mg, thiamine 100 mg, mvi, adult no. 4 with vit K 10 mL infusion   IntraVENous DAILY    hydrALAZINE (APRESOLINE) 20 mg/mL injection 10 mg  10 mg IntraVENous Q6H PRN         Chana Orozco MD  Internal Medicine  6/3/2017

## 2017-06-03 NOTE — PROGRESS NOTES
Bedside and Verbal shift change report given to Evelina Woo (oncoming nurse) by Eriberto Tellez RN (offgoing nurse). Report included the following information SBAR, Kardex, Intake/Output, MAR and Recent Results.

## 2017-06-03 NOTE — PROGRESS NOTES
GI Progress Note (for Livan Cartwright)  NAME:Tawanda Hernandez :1985 MEB:[id number]   ATTG: MD Katie  [facility]: Mariusz Alvarado MD PCP: None  Date/Time:  6/3/2017 3:44 PM   Assessment:   · EtOH+induced pancreatitis   · Epig pain- mildly improved  · Hematemesis- no recurrence     Plan:   · Continue PPI  · Advance diet slowly tomorrow if lipase improving   · IVF     Subjective:   Discussed with RN events overnight. Still requiring narcotic analgesia as Morphine 10mg q3hrs, but pain not significantly increased with clears. Complaint Y/N Description   Abdominal Pain y    Hematemesis n    Hematochezia n    Melena n    Constipation n    Diarrhea n    Dyspepsia n    Dysphagia n    Jaundiced n    Nausea/vomiting n      Review of Systems:  Symptom Y/N Comments  Symptom Y/N Comments   Fever/Chills n   Chest Pain n    Cough n   Headaches n    Sputum n   Joint Pain n    SOB/ELIZABETH n   Pruritis/Rash n    Tolerating Diet y clear  Other       Could NOT obtain due to:      Objective:   VITALS:   Last 24hrs VS reviewed since prior progress note. Most recent are:  Visit Vitals    BP (!) 162/105 (BP 1 Location: Right arm, BP Patient Position: At rest)    Pulse 93    Temp 98.2 °F (36.8 °C)    Resp 19    Ht 5' 6\" (1.676 m)    Wt 83.9 kg (185 lb)    SpO2 97%    BMI 29.86 kg/m2       Intake/Output Summary (Last 24 hours) at 17 1544  Last data filed at 17 1304   Gross per 24 hour   Intake              663 ml   Output             3250 ml   Net            -2587 ml     PHYSICAL EXAM:  General: WD, WN. Alert, cooperative, no acute distress    HEENT: NC, Atraumatic. PERRL. Anicteric sclerae. Lungs:  CTA Bilaterally. No Wheezing/Rhonchi/Rales. Heart:  Regular  rhythm,  No murmur/Rub/Gallops  Abdomen: Soft, Non distended, +epig tender.  +BS  Extremities: No c/c/e  Neurologic:  CN 2-12 gi, A/O X 3. No acute neurological distress   Psych:   Good insight. Not anxious nor agitated.     Lab and Radiology Data Reviewed: (see below)    Medications Reviewed: (see below)  PMH/SH reviewed - no change compared to H&P  ________________________________________________________________________  Total time spent with patient: 15 minutes ________________________________________________________________________  Care Plan discussed with:  Patient y   Family     RN y              Consultant:       Tita Schmidt MD     Procedures: see electronic medical records for all procedures/Xrays and details which were not copied into this note but were reviewed prior to creation of Plan. LABS:  Recent Labs      06/03/17 0557  06/01/17 0339   WBC  4.3  8.2   HGB  14.3  13.8   HCT  40.1  39.0   PLT  152  163     Recent Labs      06/03/17 0557  06/02/17   0513  06/01/17 0339 05/31/17   1705   NA  135*  133*  136   --    K  3.3*  3.1*  3.6   --    CL  97  95*  99   --    CO2  26  29  25   --    BUN  4*  3*  12   --    CREA  0.58*  0.59*  0.65*   --    GLU  70  87  87   --    CA  9.0  8.8  8.0*   --    MG   --    --   1.6  2.0   PHOS   --    --   2.1*   --      Recent Labs      06/03/17 0557  06/02/17 0513   SGOT  87*  109*   AP  92  98   TP  7.4  7.3   ALB  3.0*  3.1*   GLOB  4.4*  4.2*   LPSE  692*  1156*     No results for input(s): INR, PTP, APTT in the last 72 hours. No lab exists for component: INREXT   No results for input(s): FE, TIBC, PSAT, FERR in the last 72 hours. No results found for: FOL, RBCF  No results for input(s): PH, PCO2, PO2 in the last 72 hours. No results for input(s): CPK, CKMB in the last 72 hours.     No lab exists for component: TROPONINI  Lab Results   Component Value Date/Time    Color YELLOW/STRAW 05/31/2017 05:05 PM    Appearance CLEAR 05/31/2017 05:05 PM    Specific gravity 1.010 05/31/2017 05:05 PM    pH (UA) 5.5 05/31/2017 05:05 PM    Protein 300 05/31/2017 05:05 PM    Glucose NEGATIVE  05/31/2017 05:05 PM    Ketone TRACE 05/31/2017 05:05 PM    Bilirubin NEGATIVE  05/31/2017 05:05 PM    Urobilinogen 0.2 05/31/2017 05:05 PM    Nitrites NEGATIVE  05/31/2017 05:05 PM    Leukocyte Esterase NEGATIVE  05/31/2017 05:05 PM    Epithelial cells FEW 05/31/2017 05:05 PM    Bacteria NEGATIVE  05/31/2017 05:05 PM    WBC 0-4 05/31/2017 05:05 PM    RBC 0-5 05/31/2017 05:05 PM       MEDICATIONS:  Current Facility-Administered Medications   Medication Dose Route Frequency    morphine injection 10 mg  10 mg IntraVENous Q3H PRN    senna-docusate (PERICOLACE) 8.6-50 mg per tablet 1 Tab  1 Tab Oral BID    polyethylene glycol (MIRALAX) packet 17 g  17 g Oral DAILY PRN    polyvinyl alcohol (LIQUIFILM TEARS) 1.4 % ophthalmic solution 1 Drop  1 Drop Both Eyes PRN    bisacodyl (DULCOLAX) suppository 10 mg  10 mg Rectal DAILY PRN    LORazepam (ATIVAN) injection 4 mg  4 mg IntraVENous Q1H PRN    LORazepam (ATIVAN) tablet 2 mg  2 mg Oral Q1H PRN    pantoprazole (PROTONIX) tablet 40 mg  40 mg Oral ACB    sodium chloride (NS) flush 5-10 mL  5-10 mL IntraVENous Q8H    sodium chloride (NS) flush 5-10 mL  5-10 mL IntraVENous PRN    lactated ringers infusion  100 mL/hr IntraVENous CONTINUOUS    ondansetron (ZOFRAN) injection 4 mg  4 mg IntraVENous Q4H PRN    0.9% sodium chloride 6,176 mL with folic acid 1 mg, thiamine 100 mg, mvi, adult no. 4 with vit K 10 mL infusion   IntraVENous DAILY    hydrALAZINE (APRESOLINE) 20 mg/mL injection 10 mg  10 mg IntraVENous Q6H PRN

## 2017-06-03 NOTE — PROGRESS NOTES
Spiritual Care Partner Volunteer visited patient in [address] on 6.2.17.     Documented by:  Lon Metzger, Staff   Please call [phone] to page  if needed

## 2017-06-04 LAB
ALBUMIN SERPL BCP-MCNC: 3.2 G/DL (ref 3.5–5)
ALBUMIN/GLOB SERPL: 0.6 {RATIO} (ref 1.1–2.2)
ALP SERPL-CCNC: 98 U/L (ref 45–117)
ALT SERPL-CCNC: 108 U/L (ref 12–78)
ANION GAP BLD CALC-SCNC: 9 MMOL/L (ref 5–15)
AST SERPL W P-5'-P-CCNC: 110 U/L (ref 15–37)
BILIRUB SERPL-MCNC: 0.8 MG/DL (ref 0.2–1)
BUN SERPL-MCNC: 5 MG/DL (ref 6–20)
BUN/CREAT SERPL: 7 (ref 12–20)
CALCIUM SERPL-MCNC: 9.6 MG/DL (ref 8.5–10.1)
CHLORIDE SERPL-SCNC: 100 MMOL/L (ref 97–108)
CO2 SERPL-SCNC: 25 MMOL/L (ref 21–32)
CREAT SERPL-MCNC: 0.7 MG/DL (ref 0.7–1.3)
GLOBULIN SER CALC-MCNC: 5 G/DL (ref 2–4)
GLUCOSE BLD STRIP.AUTO-MCNC: 89 MG/DL (ref 65–100)
GLUCOSE SERPL-MCNC: 98 MG/DL (ref 65–100)
LIPASE SERPL-CCNC: 583 U/L (ref 73–393)
POTASSIUM SERPL-SCNC: 3.3 MMOL/L (ref 3.5–5.1)
PROT SERPL-MCNC: 8.2 G/DL (ref 6.4–8.2)
SERVICE CMNT-IMP: NORMAL
SODIUM SERPL-SCNC: 134 MMOL/L (ref 136–145)

## 2017-06-04 PROCEDURE — 36415 COLL VENOUS BLD VENIPUNCTURE: CPT | Performed by: HOSPITALIST

## 2017-06-04 PROCEDURE — 74011250636 HC RX REV CODE- 250/636: Performed by: FAMILY MEDICINE

## 2017-06-04 PROCEDURE — 65660000000 HC RM CCU STEPDOWN

## 2017-06-04 PROCEDURE — 82962 GLUCOSE BLOOD TEST: CPT

## 2017-06-04 PROCEDURE — 83690 ASSAY OF LIPASE: CPT | Performed by: HOSPITALIST

## 2017-06-04 PROCEDURE — 74011250637 HC RX REV CODE- 250/637: Performed by: INTERNAL MEDICINE

## 2017-06-04 PROCEDURE — 74011000250 HC RX REV CODE- 250: Performed by: FAMILY MEDICINE

## 2017-06-04 PROCEDURE — 74011250637 HC RX REV CODE- 250/637: Performed by: HOSPITALIST

## 2017-06-04 PROCEDURE — 80053 COMPREHEN METABOLIC PANEL: CPT | Performed by: HOSPITALIST

## 2017-06-04 PROCEDURE — 74011250636 HC RX REV CODE- 250/636: Performed by: HOSPITALIST

## 2017-06-04 RX ADMIN — PANTOPRAZOLE SODIUM 40 MG: 40 TABLET, DELAYED RELEASE ORAL at 08:15

## 2017-06-04 RX ADMIN — Medication 10 MG: at 19:38

## 2017-06-04 RX ADMIN — Medication 10 MG: at 00:40

## 2017-06-04 RX ADMIN — Medication 10 MG: at 08:15

## 2017-06-04 RX ADMIN — Medication 10 MG: at 15:35

## 2017-06-04 RX ADMIN — DOCUSATE SODIUM AND SENNOSIDES 1 TABLET: 8.6; 5 TABLET, FILM COATED ORAL at 08:15

## 2017-06-04 RX ADMIN — FOLIC ACID: 5 INJECTION, SOLUTION INTRAMUSCULAR; INTRAVENOUS; SUBCUTANEOUS at 08:15

## 2017-06-04 RX ADMIN — DOCUSATE SODIUM AND SENNOSIDES 1 TABLET: 8.6; 5 TABLET, FILM COATED ORAL at 17:22

## 2017-06-04 RX ADMIN — LORAZEPAM 2 MG: 1 TABLET ORAL at 05:18

## 2017-06-04 RX ADMIN — Medication 10 MG: at 11:57

## 2017-06-04 RX ADMIN — SODIUM CHLORIDE, SODIUM LACTATE, POTASSIUM CHLORIDE, AND CALCIUM CHLORIDE 100 ML/HR: 600; 310; 30; 20 INJECTION, SOLUTION INTRAVENOUS at 21:09

## 2017-06-04 RX ADMIN — LORAZEPAM 2 MG: 1 TABLET ORAL at 00:40

## 2017-06-04 RX ADMIN — Medication 10 MG: at 22:35

## 2017-06-04 RX ADMIN — Medication 10 ML: at 05:18

## 2017-06-04 RX ADMIN — Medication 10 MG: at 05:18

## 2017-06-04 NOTE — PROGRESS NOTES
Sydna Sacks, MD   Phone/text: [phone] (7am to 7pm)  After 7pm please call  for hospitalist on call    Hospitalist Progress Note     6/4/2017   PCP:  Dr. Liz Okeefe   Patient presents with    Abdominal Pain       Admission Summary:   The patient is a 81-year-old male with past medical history of pancreatitis, alcohol abuse, hypertension, who presents to the hospital with abdominal pain. Reason For Visit:  Abdominal pain    Assessment/Plan   Acute pancreatitis with likely pseudocyst (POA) - due to alcohol, a bit better today  - CT abdomen 5/31 with peripancreatic inflammation, walled off mass anterior to the pancrease, moderate hepatic steatosis  - RUQ US 5/31 with mild to moderate hepatic steatosis. - Continue clear liquids and try GI lite/low-fat tomorrow  - IV fluids  - Pain control/antiemetics    GI bleed with hematemesis in the setting of vomiting (POA) - EtOH induced irritation of the stomach, no further bleeding  - EGD 6/1 with moderate stomach erythema  - PPI  - Monitor HH  - GI consulted    Elevated LFTs - suspect an element of acute alcoholic hepatitis, MDRAFAL low, no signs of obstruction on imagining above, continue to monitor    Severe Hypertriglyceridemia (POA) - likely due to EtOH, resolved with fluids  - Checking A1c, TSH wnl    EtOH abuse (chronic) - sounds like binge drinking but monitoring for withdrawal none-the-less. CIWA scores about the same  - CIWA with ativan as needed    Hypokalemia (POA) - monitor and replace as needed    Bilateral parotid gland swelling - possibly from vomiting    See orders for other plans. VTE prophylaxis: SCDs  Discussed plan of care with Patient/Family and Nurse   Pre-admission lived at home  Discharge plan: home  Estimated time to discharge: tomorrow if tolerating PO     Subjective   Mr. Blake Marie is about the same. Tolerating clear liquids. Still with some abdominal pain but nausea improved.     Reviewed interval history  Physical examination     Visit Vitals    BP (!) 149/108 (BP 1 Location: Left arm, BP Patient Position: At rest)    Pulse 79    Temp 97.9 °F (36.6 °C)    Resp 16    Ht 5' 6\" (1.676 m)    Wt 83.9 kg (185 lb)    SpO2 97%    BMI 29.86 kg/m2       Temp (24hrs), Av.1 °F (36.7 °C), Min:97.8 °F (36.6 °C), Max:98.4 °F (36.9 °C)      Intake/Output Summary (Last 24 hours) at 17 0837  Last data filed at 17 1721   Gross per 24 hour   Intake                0 ml   Output              650 ml   Net             -650 ml       Gen - NAD  HEENT - MMM, bilateral parotid gland swelling  Neck - supple, full ROM  CV - RRR, no MRG  Resp - lungs CTA b/l, no wheezing, normal WOB  GI - abdomen S, tender in epigastrium, ND, +BS.  - no CVA tenderness, bladder non-palpable in lower abdomen  MSK - normal tone and bulk, no edema  Neuro - A&O, no focal deficits  Psych - calm and cooperative with normal affect    Data Review       Telemetry    ECG    Xray    CT scan    MRI    Echocardiogram    Ultrasound              I have reviewed the flow sheet and recent notes  New labs and data personally reviewed.     Recent Labs      17   0557   WBC  4.3   HGB  14.3   HCT  40.1   PLT  152     Recent Labs      17   0346  17   0557  17   0513   NA  134*  135*  133*   K  3.3*  3.3*  3.1*   CL  100  97  95*   CO2  25  26  29   GLU  98  70  87   BUN  5*  4*  3*   CREA  0.70  0.58*  0.59*   CA  9.6  9.0  8.8   ALB  3.2*  3.0*  3.1*   TBILI  0.8  0.8  1.2*   SGOT  110*  87*  109*   ALT  108*  81*  82*       Medications reviewed  Current Facility-Administered Medications   Medication Dose Route Frequency    morphine injection 10 mg  10 mg IntraVENous Q3H PRN    senna-docusate (PERICOLACE) 8.6-50 mg per tablet 1 Tab  1 Tab Oral BID    polyethylene glycol (MIRALAX) packet 17 g  17 g Oral DAILY PRN    polyvinyl alcohol (LIQUIFILM TEARS) 1.4 % ophthalmic solution 1 Drop  1 Drop Both Eyes PRN    bisacodyl (DULCOLAX) suppository 10 mg  10 mg Rectal DAILY PRN    LORazepam (ATIVAN) injection 4 mg  4 mg IntraVENous Q1H PRN    LORazepam (ATIVAN) tablet 2 mg  2 mg Oral Q1H PRN    pantoprazole (PROTONIX) tablet 40 mg  40 mg Oral ACB    sodium chloride (NS) flush 5-10 mL  5-10 mL IntraVENous Q8H    sodium chloride (NS) flush 5-10 mL  5-10 mL IntraVENous PRN    lactated ringers infusion  100 mL/hr IntraVENous CONTINUOUS    ondansetron (ZOFRAN) injection 4 mg  4 mg IntraVENous Q4H PRN    0.9% sodium chloride 3,897 mL with folic acid 1 mg, thiamine 100 mg, mvi, adult no. 4 with vit K 10 mL infusion   IntraVENous DAILY    hydrALAZINE (APRESOLINE) 20 mg/mL injection 10 mg  10 mg IntraVENous Q6H PRN         Ad Floyd MD  Internal Medicine  6/4/2017

## 2017-06-04 NOTE — PROGRESS NOTES
GI Progress Note (for [facility])  NAME:Tawanda Hernandez :1985 [facility]:[id number]   ATTG: MD Katie  Prim GI: Shahbaz Luu MD PCP: None  Date/Time:  2017 1441   Assessment:   · EtOH+induced pancreatitis   · Epig pain- mildly improved  · Hematemesis- no recurrence     Plan:   · Continue PPI  · Advance diet slowly tomorrow to solids if lipase still improved   · IVF     Subjective:   Discussed with RN events overnight. Still requiring narcotic analgesia as Morphine 10mg q3hrs, but pain not significantly increased with fulls. Complaint Y/N Description   Abdominal Pain y minimal   Hematemesis n    Hematochezia n    Melena n    Constipation n    Diarrhea n    Dyspepsia n    Dysphagia n    Jaundiced n    Nausea/vomiting n      Review of Systems:  Symptom Y/N Comments  Symptom Y/N Comments   Fever/Chills n   Chest Pain n    Cough n   Headaches n    Sputum n   Joint Pain n    SOB/ELIZABETH n   Pruritis/Rash n    Tolerating Diet y full  Other       Could NOT obtain due to:      Objective:   VITALS:   Last 24hrs VS reviewed since prior progress note. Most recent are:  Visit Vitals    BP (!) 149/108 (BP 1 Location: Left arm, BP Patient Position: At rest)    Pulse 79    Temp 97.9 °F (36.6 °C)    Resp 16    Ht 5' 6\" (1.676 m)    Wt 83.9 kg (185 lb)    SpO2 97%    BMI 29.86 kg/m2       Intake/Output Summary (Last 24 hours) at 17 1441  Last data filed at 17 1721   Gross per 24 hour   Intake                0 ml   Output              250 ml   Net             -250 ml     PHYSICAL EXAM:  General: WD, WN. Alert, cooperative, no acute distress    HEENT: NC, Atraumatic. PERRL. Anicteric sclerae. Lungs:  CTA Bilaterally. No Wheezing/Rhonchi/Rales. Heart:  Regular  rhythm,  No murmur/Rub/Gallops  Abdomen: Soft, Non distended, +minimal epig tender.  +BS  Extremities: No c/c/e  Neurologic:  CN 2-12 gi, A/O X 3. No acute neurological distress   Psych:   Good insight. Not anxious nor agitated.     Lab and Radiology Data Reviewed: (see below)    Medications Reviewed: (see below)  PMH/SH reviewed - no change compared to H&P  ________________________________________________________________________  Total time spent with patient: 15 minutes ________________________________________________________________________  Care Plan discussed with:  Patient y   Family     RN y              Consultant:       Tee Barron MD     Procedures: see electronic medical records for all procedures/Xrays and details which were not copied into this note but were reviewed prior to creation of Plan. LABS:  Recent Labs      06/03/17 0557   WBC  4.3   HGB  14.3   HCT  40.1   PLT  152     Recent Labs      06/04/17 0346 06/03/17   0557  06/02/17   0513   NA  134*  135*  133*   K  3.3*  3.3*  3.1*   CL  100  97  95*   CO2  25  26  29   BUN  5*  4*  3*   CREA  0.70  0.58*  0.59*   GLU  98  70  87   CA  9.6  9.0  8.8     Recent Labs      06/04/17 0346 06/03/17   0557  06/02/17   0513   SGOT  110*  87*  109*   AP  98  92  98   TP  8.2  7.4  7.3   ALB  3.2*  3.0*  3.1*   GLOB  5.0*  4.4*  4.2*   LPSE  583*  692*  1156*     No results for input(s): INR, PTP, APTT in the last 72 hours. No lab exists for component: INREXT, INREXT   No results for input(s): FE, TIBC, PSAT, FERR in the last 72 hours. No results found for: FOL, RBCF  No results for input(s): PH, PCO2, PO2 in the last 72 hours. No results for input(s): CPK, CKMB in the last 72 hours.     No lab exists for component: TROPONINI  Lab Results   Component Value Date/Time    Color YELLOW/STRAW 05/31/2017 05:05 PM    Appearance CLEAR 05/31/2017 05:05 PM    Specific gravity 1.010 05/31/2017 05:05 PM    pH (UA) 5.5 05/31/2017 05:05 PM    Protein 300 05/31/2017 05:05 PM    Glucose NEGATIVE  05/31/2017 05:05 PM    Ketone TRACE 05/31/2017 05:05 PM    Bilirubin NEGATIVE  05/31/2017 05:05 PM    Urobilinogen 0.2 05/31/2017 05:05 PM    Nitrites NEGATIVE  05/31/2017 05:05 PM    Leukocyte Esterase NEGATIVE  05/31/2017 05:05 PM    Epithelial cells FEW 05/31/2017 05:05 PM    Bacteria NEGATIVE  05/31/2017 05:05 PM    WBC 0-4 05/31/2017 05:05 PM    RBC 0-5 05/31/2017 05:05 PM       MEDICATIONS:  Current Facility-Administered Medications   Medication Dose Route Frequency    morphine injection 10 mg  10 mg IntraVENous Q3H PRN    senna-docusate (PERICOLACE) 8.6-50 mg per tablet 1 Tab  1 Tab Oral BID    polyethylene glycol (MIRALAX) packet 17 g  17 g Oral DAILY PRN    polyvinyl alcohol (LIQUIFILM TEARS) 1.4 % ophthalmic solution 1 Drop  1 Drop Both Eyes PRN    bisacodyl (DULCOLAX) suppository 10 mg  10 mg Rectal DAILY PRN    LORazepam (ATIVAN) injection 4 mg  4 mg IntraVENous Q1H PRN    LORazepam (ATIVAN) tablet 2 mg  2 mg Oral Q1H PRN    pantoprazole (PROTONIX) tablet 40 mg  40 mg Oral ACB    sodium chloride (NS) flush 5-10 mL  5-10 mL IntraVENous Q8H    sodium chloride (NS) flush 5-10 mL  5-10 mL IntraVENous PRN    lactated ringers infusion  100 mL/hr IntraVENous CONTINUOUS    ondansetron (ZOFRAN) injection 4 mg  4 mg IntraVENous Q4H PRN    0.9% sodium chloride 7,557 mL with folic acid 1 mg, thiamine 100 mg, mvi, adult no. 4 with vit K 10 mL infusion   IntraVENous DAILY    hydrALAZINE (APRESOLINE) 20 mg/mL injection 10 mg  10 mg IntraVENous Q6H PRN

## 2017-06-05 VITALS
SYSTOLIC BLOOD PRESSURE: 153 MMHG | WEIGHT: 185 LBS | HEIGHT: 66 IN | OXYGEN SATURATION: 96 % | DIASTOLIC BLOOD PRESSURE: 102 MMHG | TEMPERATURE: 98.4 F | RESPIRATION RATE: 16 BRPM | BODY MASS INDEX: 29.73 KG/M2 | HEART RATE: 88 BPM

## 2017-06-05 LAB
GLUCOSE BLD STRIP.AUTO-MCNC: 122 MG/DL (ref 65–100)
GLUCOSE BLD STRIP.AUTO-MCNC: 96 MG/DL (ref 65–100)
IGG4 SER-MCNC: 19 MG/DL (ref 2–96)
SERVICE CMNT-IMP: ABNORMAL
SERVICE CMNT-IMP: NORMAL

## 2017-06-05 PROCEDURE — 74011250637 HC RX REV CODE- 250/637: Performed by: INTERNAL MEDICINE

## 2017-06-05 PROCEDURE — 74011250636 HC RX REV CODE- 250/636: Performed by: HOSPITALIST

## 2017-06-05 PROCEDURE — 82962 GLUCOSE BLOOD TEST: CPT

## 2017-06-05 PROCEDURE — 74011250637 HC RX REV CODE- 250/637: Performed by: HOSPITALIST

## 2017-06-05 PROCEDURE — 74011250636 HC RX REV CODE- 250/636: Performed by: FAMILY MEDICINE

## 2017-06-05 PROCEDURE — 74011000250 HC RX REV CODE- 250: Performed by: FAMILY MEDICINE

## 2017-06-05 RX ORDER — PANTOPRAZOLE SODIUM 40 MG/1
40 TABLET, DELAYED RELEASE ORAL
Qty: 30 TAB | Refills: 0 | Status: SHIPPED | OUTPATIENT
Start: 2017-06-05

## 2017-06-05 RX ORDER — AMLODIPINE BESYLATE 10 MG/1
10 TABLET ORAL DAILY
Qty: 30 TAB | Refills: 0 | Status: SHIPPED | OUTPATIENT
Start: 2017-06-05

## 2017-06-05 RX ORDER — ONDANSETRON 4 MG/1
4 TABLET, ORALLY DISINTEGRATING ORAL
Qty: 10 TAB | Refills: 0 | Status: SHIPPED | OUTPATIENT
Start: 2017-06-05

## 2017-06-05 RX ORDER — OXYCODONE HYDROCHLORIDE 5 MG/1
5-10 TABLET ORAL
Qty: 20 TAB | Refills: 0 | Status: SHIPPED | OUTPATIENT
Start: 2017-06-05

## 2017-06-05 RX ORDER — AMLODIPINE BESYLATE 5 MG/1
10 TABLET ORAL DAILY
Status: DISCONTINUED | OUTPATIENT
Start: 2017-06-05 | End: 2017-06-05 | Stop reason: HOSPADM

## 2017-06-05 RX ADMIN — DOCUSATE SODIUM AND SENNOSIDES 1 TABLET: 8.6; 5 TABLET, FILM COATED ORAL at 09:58

## 2017-06-05 RX ADMIN — Medication 10 MG: at 01:39

## 2017-06-05 RX ADMIN — AMLODIPINE BESYLATE 10 MG: 5 TABLET ORAL at 11:52

## 2017-06-05 RX ADMIN — Medication 10 ML: at 08:26

## 2017-06-05 RX ADMIN — Medication 10 MG: at 04:49

## 2017-06-05 RX ADMIN — FOLIC ACID: 5 INJECTION, SOLUTION INTRAMUSCULAR; INTRAVENOUS; SUBCUTANEOUS at 08:25

## 2017-06-05 RX ADMIN — PANTOPRAZOLE SODIUM 40 MG: 40 TABLET, DELAYED RELEASE ORAL at 07:28

## 2017-06-05 RX ADMIN — Medication 10 MG: at 08:21

## 2017-06-05 RX ADMIN — HYDRALAZINE HYDROCHLORIDE 10 MG: 20 INJECTION INTRAMUSCULAR; INTRAVENOUS at 08:19

## 2017-06-05 NOTE — PROGRESS NOTES
Patient has declined having his Rxs filled here. He is in a hurry to leave. Cab voucher has been obtained for the patient to go home. All discharge instructions and medications and follow-up have been explained and given copies of all information.

## 2017-06-05 NOTE — ROUTINE PROCESS
Bedside shift change report given to Kristi Doss RN (oncoming nurse) by Didi Frazier RN(offgoing nurse). Report given with SBAR.

## 2017-06-05 NOTE — DISCHARGE INSTRUCTIONS
Please bring this form with you to show your primary care provider at your follow-up appointment. Primary care provider:  Dr. Yeimi Rockwell    Discharging provider:  Sydna Sacks, MD    You have been admitted to the hospital with the following diagnoses:  · Stomach bleed  · Pancreatitis    FOLLOW-UP CARE RECOMMENDATIONS:    APPOINTMENTS:  Follow-up Information     Follow up With Details Comments Contact Info    Primary care doctor Call For follow up after hospitalization Please make an appointment with a Primary Care Doctor to follow up on your high blood pressure and recurrent pancreatitis         FOLLOW-UP TESTS recommended: Blood pressure check    SYMPTOMS to watch for: nausea, vomiting, diarrhea, bleeding, black tarry stools. DIET/what to eat:  Please start with bland, low-fat diet (no oil, butter, fats) and add back foods as you feel better and better. Please drink plenty of fluids. ACTIVITY:  Activity as tolerated and No driving while on narcotics    What to do if new or unexpected symptoms occur? If you experience any of the above symptoms (or should other concerns or questions arise after discharge) please call your primary care physician. Return to the emergency room if you cannot get hold of your doctor. · It is very important that you keep your follow-up appointment(s). · Please bring discharge papers, medication list (and/or medication bottles) to your follow-up appointments for review by your outpatient provider(s). · Please check the list of medications and be sure it includes every medication (even non-prescription medications) that your provider wants you to take. · It is important that you take the medication exactly as they are prescribed. · Keep your medication in the bottles provided by the pharmacist and keep a list of the medication names, dosages, and times to be taken in your wallet. · Do not take other medications without consulting your doctor.    · If you have any questions about your medications or other instructions, please talk to your nurse or care provider before you leave the hospital.    I understand that if any problems occur once I am at home I am to contact my physician. These instructions were explained to me and I had the opportunity to ask questions.

## 2017-06-05 NOTE — DISCHARGE SUMMARY
Discharge Summary     Patient:  Imelda De La Vega       MRN: [id number]       YOB: 1985       Age: 33 y.o. Date of admission:  5/31/2017    Date of discharge:  6/5/2017    Primary care provider: Dr. Eda Rousseau provider:  Tenzin Wolff MD    Discharging provider:  Frank Luu [address] [phone]. If unavailable, call [phone] and ask the  to page the triage hospitalist.    Consultations  · IP CONSULT TO HOSPITALIST  · IP CONSULT TO GASTROENTEROLOGY  · IP CONSULT TO GASTROENTEROLOGY    Procedures  · Procedure(s) with comments:  · ESOPHAGOGASTRODUODENOSCOPY (EGD) - egd  · ESOPHAGOGASTRODUODENAL (EGD) BIOPSY    Discharge destination: Home. The patient is stable for discharge. Admission diagnosis  · pancreatitis  · gi bleed  · Pancreatitis    Current Discharge Medication List      START taking these medications    Details   pantoprazole (PROTONIX) 40 mg tablet Take 1 Tab by mouth Daily (before breakfast). Qty: 30 Tab, Refills: 0      oxyCODONE IR (ROXICODONE) 5 mg immediate release tablet Take 1-2 Tabs by mouth every four (4) hours as needed for Pain. Max Daily Amount: 60 mg.  Qty: 20 Tab, Refills: 0      ondansetron (ZOFRAN ODT) 4 mg disintegrating tablet Take 1 Tab by mouth every eight (8) hours as needed for Nausea. Qty: 10 Tab, Refills: 0      amLODIPine (NORVASC) 10 mg tablet Take 1 Tab by mouth daily. Qty: 30 Tab, Refills: 0         STOP taking these medications       ibuprofen (ADVIL) 200 mg tablet Comments:   Reason for Stopping:                Follow-up Information     Follow up With Details Comments Contact Info    Primary care doctor Call For follow up after hospitalization Please make an appointment with a Primary Care Doctor to follow up on your high blood pressure and recurrent pancreatitis          Final discharge diagnoses and brief hospital course  Please also refer to the admission H&P for details on the presenting problem. The patient is a 72-year-old male with past medical history of pancreatitis, alcohol abuse, hypertension, who presents to the hospital with abdominal pain. Acute pancreatitis with likely pseudocyst (POA) - due to alcohol, tolerating bland diet  - CT abdomen 5/31 with peripancreatic inflammation, walled off mass anterior to the pancrease, moderate hepatic steatosis  - RUQ US 5/31 with mild to moderate hepatic steatosis. - Continue low-fat diet and advance as tolerated  - IV fluids  - Pain control/antiemetics     Chronic active gastritis with bleeding due to H. Pylori and EtOH abuse (POA) - no further bleeding  - EGD 6/1 with moderate stomach erythema  - PPI  - Monitor HH  - GI consulted     Elevated LFTs - suspect an element of acute alcoholic hepatitis, MDF low, no signs of obstruction on imagining above, continue to monitor     Severe Hypertriglyceridemia (POA) - likely due to EtOH, resolved with fluids     EtOH abuse (chronic) - sounds like binge drinking but monitoring for withdrawal none-the-less. CIWA improving and not needing ativan  - CIWA with ativan as needed  - Counseled on cessation     Hypokalemia (POA) - monitor and replace as needed     Bilateral parotid gland swelling - possibly from vomiting    HTN - some driven by pain but I suspect that there is a component of underlying essential HTN. Start amlodipine. Needs to follow up with PCP to monitor BP. FOLLOW-UP CARE RECOMMENDATIONS:     FOLLOW-UP TESTS recommended: Blood pressure check    SYMPTOMS to watch for: nausea, vomiting, diarrhea, bleeding, black tarry stools. DIET/what to eat:  Please start with bland, low-fat diet (no oil, butter, fats) and add back foods as you feel better and better. Please drink plenty of fluids. ACTIVITY:  Activity as tolerated and No driving while on narcotics    What to do if new or unexpected symptoms occur?   If you experience any of the above symptoms (or should other concerns or questions arise after discharge) please call your primary care physician. Return to the emergency room if you cannot get hold of your doctor. · It is very important that you keep your follow-up appointment(s). · Please bring discharge papers, medication list (and/or medication bottles) to your follow-up appointments for review by your outpatient provider(s). · Please check the list of medications and be sure it includes every medication (even non-prescription medications) that your provider wants you to take. · It is important that you take the medication exactly as they are prescribed. · Keep your medication in the bottles provided by the pharmacist and keep a list of the medication names, dosages, and times to be taken in your wallet. · Do not take other medications without consulting your doctor. · If you have any questions about your medications or other instructions, please talk to your nurse or care provider before you leave the hospital.      Physical examination at discharge  Visit Vitals    BP (!) 153/102    Pulse 88    Temp 98.4 °F (36.9 °C)    Resp 16    Ht 5' 6\" (1.676 m)    Wt 83.9 kg (185 lb)    SpO2 96%    BMI 29.86 kg/m2       Gen - NAD  HEENT - MMM, bilateral parotid gland swelling  Neck - supple, full ROM  CV - RRR, no MRG  Resp - lungs CTA b/l, no wheezing, normal WOB  GI - abdomen S, tender in epigastrium, ND, +BS.  - no CVA tenderness, bladder non-palpable in lower abdomen  MSK - normal tone and bulk, no edema  Neuro - A&O, no focal deficits  Psych - calm and cooperative with normal affect    Pertinent imaging studies:    US abdomen 5/31/17  FINDINGS:   GALLBLADDER: Minimal sludge layers dependently. Otherwise normal.  COMMON DUCT: 0. 5 cm in diameter. The duct is normal caliber. LIVER: The liver is echogenic relative to the right kidney, consistent with mild  to moderate hepatic steatosis.  The deep portions of the liver are not well  penetrated as a result. No focal mass or intrahepatic biliary ductal dilation is  shown in the superficial portions of the liver. PANCREAS: Obscured by bowel gas. RIGHT KIDNEY: 11.7 cm in length. No hydronephrosis, shadowing calculus, or  contour-deforming renal mass. The lower pole is obscured by overlying ribs and  bowel gas. IMPRESSION:   Mild to moderate hepatic steatosis. CT abdomen/pelvis 5/31/17  FINDINGS:  Pancreatitis: Mild peripancreatic fat stranding is consistent with acute  pancreatitis. A 24 x 43 x 22 mm, low density, homogenous, oval mass anterior to  the pancreas in the lesser sac is probably complex fluid within an old  walled-off necrosis (\"pseudocyst\"). Though there are no priors available for  comparison, this is felt to represent an old walled-off necrosis, as its margins  are completely circumscribed, and the fat planes around it are well preserved,  with no surrounding fat stranding. Acute inflammation/fat stranding is centered  around the pancreatic head, uncinate process, and second portion of the  duodenum. Abdomen: Moderate hepatic steatosis may be secondary to acute hepatic injury  from pancreatitis, though underlying/pre-existing steatosis is favored. An  accessory hepatic vein draining segments 5 and 6 is a normal variant. The lung  bases are clear. The heart size is normal. The distal esophagus, stomach,  gallbladder, spleen, adrenals, and kidneys are normal.   Pelvis: The bladder is distended, extending to the level of the umbilicus. Mild  ureterectasis is associated. The small bowel, ileocecal junction, appendix, and  colon are normal. No free air or fluid, and no abdominopelvic lymphadenopathy. IMPRESSION:   1. Acute, uncomplicated pancreatitis. 2. Homogenous, circumscribed mass in the lesser sac anterior to the pancreas. 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  If prior cross-sectional  images are available, a comparison addendum can be made to this report when they  are uploaded to PACS. 3. Moderate hepatic steatosis. Recent Labs      06/03/17   0557   WBC  4.3   HGB  14.3   HCT  40.1   PLT  152     Recent Labs      06/04/17   0346  06/03/17   0557   NA  134*  135*   K  3.3*  3.3*   CL  100  97   CO2  25  26   BUN  5*  4*   CREA  0.70  0.58*   GLU  98  70   CA  9.6  9.0     Recent Labs      06/04/17   0346  06/03/17   0557   SGOT  110*  87*   AP  98  92   TP  8.2  7.4   ALB  3.2*  3.0*   GLOB  5.0*  4.4*   LPSE  583*  692*     No results for input(s): INR, PTP, APTT in the last 72 hours. No lab exists for component: INREXT   No results for input(s): FE, TIBC, PSAT, FERR in the last 72 hours. No results for input(s): PH, PCO2, PO2 in the last 72 hours. No results for input(s): CPK, CKMB in the last 72 hours. No lab exists for component: TROPONINI  No components found for: [place]    Chronic Diagnoses:    Problem List as of 6/5/2017  Date Reviewed: 5/31/2017          Codes Class Noted - Resolved    * (Principal)Pancreatitis ICD-10-CM: K85.90  ICD-9-CM: 850.9  5/31/2017 - Present              Time spent on discharge related activities today greater than 30 minutes.       Signed:  Brittany Haskins MD                 Hospitalist, Internal Medicine      Cc: None

## 2017-06-05 NOTE — PROGRESS NOTES
Patient picked up by [facility] cab service. Had all belongings, rxs and dc instructions and signed computer discharge screen.

## 2017-06-05 NOTE — PROGRESS NOTES
Cm informed that patient is being discharged home today and has requested assistance with his medication and transportation. Cm met with patient to discuss, informed him care management could assist with the medications, minus the narcotic, but it would be a couple of hours before they were ready. Patient stated he could not wait a couple of hours, as his current living situation is ending today and he needs to  his items from that home before 1 pm. Patient stated he will get the scripts filled on his own. Patient does need a cab home (the address is not on the bus line) to: [address] [place] [address],[address] [address]. Alex informed the CCL of this and she will get a cab ticket from the nursing supervisor.   [employer] , [employer]

## 2017-07-26 ENCOUNTER — HOSPITAL ENCOUNTER (EMERGENCY)
Age: 32
Discharge: HOME | End: 2017-07-26
Attending: STUDENT IN AN ORGANIZED HEALTH CARE EDUCATION/TRAINING PROGRAM
Payer: SELF-PAY

## 2017-07-26 VITALS
RESPIRATION RATE: 12 BRPM | SYSTOLIC BLOOD PRESSURE: 132 MMHG | BODY MASS INDEX: 29.1 KG/M2 | DIASTOLIC BLOOD PRESSURE: 83 MMHG | TEMPERATURE: 97.9 F | HEART RATE: 77 BPM | OXYGEN SATURATION: 92 % | WEIGHT: 181.06 LBS | HEIGHT: 66 IN

## 2017-07-26 DIAGNOSIS — R10.84 ABDOMINAL PAIN, GENERALIZED: Primary | ICD-10-CM

## 2017-07-26 LAB
ALBUMIN SERPL BCP-MCNC: 3.6 G/DL (ref 3.5–5)
ALBUMIN/GLOB SERPL: 0.7 {RATIO} (ref 1.1–2.2)
ALP SERPL-CCNC: 80 U/L (ref 45–117)
ALT SERPL-CCNC: 53 U/L (ref 12–78)
ANION GAP BLD CALC-SCNC: 7 MMOL/L (ref 5–15)
AST SERPL W P-5'-P-CCNC: 32 U/L (ref 15–37)
BASOPHILS # BLD AUTO: 0 K/UL (ref 0–0.1)
BASOPHILS # BLD: 1 % (ref 0–1)
BILIRUB SERPL-MCNC: 0.2 MG/DL (ref 0.2–1)
BUN SERPL-MCNC: 13 MG/DL (ref 6–20)
BUN/CREAT SERPL: 15 (ref 12–20)
CALCIUM SERPL-MCNC: 9 MG/DL (ref 8.5–10.1)
CHLORIDE SERPL-SCNC: 108 MMOL/L (ref 97–108)
CO2 SERPL-SCNC: 25 MMOL/L (ref 21–32)
CREAT SERPL-MCNC: 0.88 MG/DL (ref 0.7–1.3)
EOSINOPHIL # BLD: 0.1 K/UL (ref 0–0.4)
EOSINOPHIL NFR BLD: 2 % (ref 0–7)
ERYTHROCYTE [DISTWIDTH] IN BLOOD BY AUTOMATED COUNT: 12.5 % (ref 11.5–14.5)
GLOBULIN SER CALC-MCNC: 5 G/DL (ref 2–4)
GLUCOSE SERPL-MCNC: 107 MG/DL (ref 65–100)
HCT VFR BLD AUTO: 46.7 % (ref 36.6–50.3)
HGB BLD-MCNC: 16.6 G/DL (ref 12.1–17)
LIPASE SERPL-CCNC: 134 U/L (ref 73–393)
LYMPHOCYTES # BLD AUTO: 62 % (ref 12–49)
LYMPHOCYTES # BLD: 2.9 K/UL (ref 0.8–3.5)
MCH RBC QN AUTO: 32.2 PG (ref 26–34)
MCHC RBC AUTO-ENTMCNC: 35.5 G/DL (ref 30–36.5)
MCV RBC AUTO: 90.5 FL (ref 80–99)
MONOCYTES # BLD: 0.3 K/UL (ref 0–1)
MONOCYTES NFR BLD AUTO: 6 % (ref 5–13)
NEUTS SEG # BLD: 1.3 K/UL (ref 1.8–8)
NEUTS SEG NFR BLD AUTO: 29 % (ref 32–75)
PLATELET # BLD AUTO: 237 K/UL (ref 150–400)
POTASSIUM SERPL-SCNC: 3.7 MMOL/L (ref 3.5–5.1)
PROT SERPL-MCNC: 8.6 G/DL (ref 6.4–8.2)
RBC # BLD AUTO: 5.16 M/UL (ref 4.1–5.7)
SODIUM SERPL-SCNC: 140 MMOL/L (ref 136–145)
WBC # BLD AUTO: 4.6 K/UL (ref 4.1–11.1)

## 2017-07-26 PROCEDURE — 99284 EMERGENCY DEPT VISIT MOD MDM: CPT

## 2017-07-26 PROCEDURE — 74011250636 HC RX REV CODE- 250/636: Performed by: STUDENT IN AN ORGANIZED HEALTH CARE EDUCATION/TRAINING PROGRAM

## 2017-07-26 PROCEDURE — 96361 HYDRATE IV INFUSION ADD-ON: CPT

## 2017-07-26 PROCEDURE — 83690 ASSAY OF LIPASE: CPT | Performed by: EMERGENCY MEDICINE

## 2017-07-26 PROCEDURE — 96375 TX/PRO/DX INJ NEW DRUG ADDON: CPT

## 2017-07-26 PROCEDURE — 85025 COMPLETE CBC W/AUTO DIFF WBC: CPT | Performed by: EMERGENCY MEDICINE

## 2017-07-26 PROCEDURE — 36415 COLL VENOUS BLD VENIPUNCTURE: CPT | Performed by: EMERGENCY MEDICINE

## 2017-07-26 PROCEDURE — 96374 THER/PROPH/DIAG INJ IV PUSH: CPT

## 2017-07-26 PROCEDURE — 80053 COMPREHEN METABOLIC PANEL: CPT | Performed by: EMERGENCY MEDICINE

## 2017-07-26 RX ORDER — ONDANSETRON 2 MG/ML
4 INJECTION INTRAMUSCULAR; INTRAVENOUS
Status: COMPLETED | OUTPATIENT
Start: 2017-07-26 | End: 2017-07-26

## 2017-07-26 RX ORDER — KETOROLAC TROMETHAMINE 10 MG/1
10 TABLET, FILM COATED ORAL
Qty: 8 TAB | Refills: 0 | Status: SHIPPED | OUTPATIENT
Start: 2017-07-26 | End: 2017-07-28

## 2017-07-26 RX ORDER — KETOROLAC TROMETHAMINE 30 MG/ML
30 INJECTION, SOLUTION INTRAMUSCULAR; INTRAVENOUS
Status: COMPLETED | OUTPATIENT
Start: 2017-07-26 | End: 2017-07-26

## 2017-07-26 RX ORDER — ONDANSETRON 4 MG/1
4 TABLET, FILM COATED ORAL
Qty: 9 TAB | Refills: 0 | Status: SHIPPED | OUTPATIENT
Start: 2017-07-26 | End: 2017-07-29

## 2017-07-26 RX ADMIN — KETOROLAC TROMETHAMINE 30 MG: 30 INJECTION, SOLUTION INTRAMUSCULAR at 16:37

## 2017-07-26 RX ADMIN — SODIUM CHLORIDE 1000 ML: 900 INJECTION, SOLUTION INTRAVENOUS at 16:37

## 2017-07-26 RX ADMIN — ONDANSETRON 4 MG: 2 INJECTION INTRAMUSCULAR; INTRAVENOUS at 16:37

## 2017-07-26 NOTE — ED NOTES
Discharge instructions and Rx reviewed with and given to pt. by ER RN. No obvious distress noted at time of discharge, ambulatory on own accord.

## 2017-07-26 NOTE — ED PROVIDER NOTES
HPI Comments: 33 y.o. male with past medical history significant for pancreatitis, HTN and EtOH abuse who presents via EMS with chief complaint of abd pain. Pt states he has been experiencing abd pain, accompanied by nausea and vomiting \"all day\" today. Pt reports he had nothing to eat or drink today. Pt reports he did have a BM \"a few hours ago\". Pt reports a \"yellow liquid\" in stool but denies bloody stool. Pt reports he did drink alcohol 2 days PTA, which he suspects may have caused onset of abd pain. Pt denies diarrhea. There are no other acute medical concerns at this time. Social hx: +EtOH use, +tobacco smoker    Note written by Tio Orr, as dictated by Emilie Lunsford MD 4:17 PM        The history is provided by the patient. Past Medical History:   Diagnosis Date    ETOH abuse     Hypertension     Pancreatitis        History reviewed. No pertinent surgical history. History reviewed. No pertinent family history. Social History     Social History    Marital status: SINGLE     Spouse name: N/A    Number of children: N/A    Years of education: N/A     Occupational History    Not on file. Social History Main Topics    Smoking status: Never Smoker    Smokeless tobacco: Not on file    Alcohol use Yes    Drug use: Not on file    Sexual activity: Not on file     Other Topics Concern    Not on file     Social History Narrative         ALLERGIES: Review of patient's allergies indicates no known allergies. Review of Systems   Constitutional: Negative for chills, diaphoresis, fatigue and fever. HENT: Negative for congestion, rhinorrhea, sinus pressure, sore throat, trouble swallowing and voice change. Eyes: Negative for photophobia and visual disturbance. Respiratory: Negative for cough, chest tightness and shortness of breath. Cardiovascular: Negative for chest pain, palpitations and leg swelling.    Gastrointestinal: Positive for abdominal pain, nausea and vomiting. Negative for blood in stool, constipation and diarrhea. Musculoskeletal: Negative for arthralgias, myalgias and neck pain. Neurological: Negative for dizziness, weakness, light-headedness, numbness and headaches. All other systems reviewed and are negative. Vitals:    07/26/17 1321 07/26/17 1631   BP: 137/90 (!) 150/96   Pulse: 98 92   Resp: 16 16   Temp: 98.3 °F (36.8 °C) 97.6 °F (36.4 °C)   SpO2: 97% 93%   Weight: 82.1 kg (181 lb 1 oz)    Height: 5' 6\" (1.676 m)             Physical Exam   Constitutional: He is oriented to person, place, and time. He appears well-developed and well-nourished. No distress. HENT:   Head: Normocephalic and atraumatic. Nose: Nose normal.   Mouth/Throat: Oropharynx is clear and moist. No oropharyngeal exudate. Eyes: Conjunctivae and EOM are normal. Right eye exhibits no discharge. Left eye exhibits no discharge. No scleral icterus. Neck: Normal range of motion. Neck supple. No JVD present. No tracheal deviation present. No thyromegaly present. Cardiovascular: Normal rate, regular rhythm, normal heart sounds and intact distal pulses. Exam reveals no gallop and no friction rub. No murmur heard. Pulmonary/Chest: Effort normal and breath sounds normal. No stridor. No respiratory distress. He has no wheezes. He has no rales. He exhibits no tenderness. Abdominal: Bowel sounds are normal. He exhibits no distension and no mass. There is tenderness. There is no rebound. Diffusely tender throughout abd   Musculoskeletal: Normal range of motion. He exhibits no edema or tenderness. Lymphadenopathy:     He has no cervical adenopathy. Neurological: He is alert and oriented to person, place, and time. No cranial nerve deficit. Coordination normal.   Skin: Skin is warm and dry. No rash noted. He is not diaphoretic. No erythema. No pallor. Psychiatric: He has a normal mood and affect.  His behavior is normal. Judgment and thought content normal.   Nursing note and vitals reviewed. Note written by Tio Katz, as dictated by Lucille Sheehan MD 4:17 PM    MDM  Number of Diagnoses or Management Options  Abdominal pain, generalized:      Amount and/or Complexity of Data Reviewed  Clinical lab tests: ordered and reviewed  Review and summarize past medical records: yes    Risk of Complications, Morbidity, and/or Mortality  Presenting problems: moderate  Diagnostic procedures: moderate  Management options: moderate    Patient Progress  Patient progress: resolved    ED Course       Procedures    PROGRESS NOTE:  5:49 PM  Pt's pain has improved. Pt will be discharged to home. 10:42 AM  The patient has been reevaluated. The patient is ready for discharge. The patient's signs, symptoms, diagnosis, and discharge instructions have been discussed and the patient/ family has conveyed their understanding. The patient is to follow up as recommended or return to the ED should their symptoms worsen. Plan has been discussed and the patient is in agreement. LABORATORY TESTS:  No results found for this or any previous visit (from the past 12 hour(s)). IMAGING RESULTS:  No orders to display     No results found. MEDICATIONS GIVEN:  Medications   ketorolac (TORADOL) injection 30 mg (30 mg IntraVENous Given 7/26/17 0857)   ondansetron (ZOFRAN) injection 4 mg (4 mg IntraVENous Given 7/26/17 2307)   sodium chloride 0.9 % bolus infusion 1,000 mL (0 mL IntraVENous IV Completed 7/26/17 8663)       IMPRESSION:  1. Abdominal pain, generalized        PLAN:  1. Discharge Medication List as of 7/26/2017  6:05 PM      START taking these medications    Details   ondansetron hcl (ZOFRAN, AS HYDROCHLORIDE,) 4 mg tablet Take 1 Tab by mouth every eight (8) hours as needed for Nausea for up to 3 days. , Print, Disp-9 Tab, R-0      ketorolac (TORADOL) 10 mg tablet Take 1 Tab by mouth every six (6) hours as needed for Pain for up to 2 days. , Print, Disp-8 Tab, R-0         CONTINUE these medications which have NOT CHANGED    Details   pantoprazole (PROTONIX) 40 mg tablet Take 1 Tab by mouth Daily (before breakfast). , Print, Disp-30 Tab, R-0      oxyCODONE IR (ROXICODONE) 5 mg immediate release tablet Take 1-2 Tabs by mouth every four (4) hours as needed for Pain. Max Daily Amount: 60 mg., Print, Disp-20 Tab, R-0      ondansetron (ZOFRAN ODT) 4 mg disintegrating tablet Take 1 Tab by mouth every eight (8) hours as needed for Nausea. , Print, Disp-10 Tab, R-0      amLODIPine (NORVASC) 10 mg tablet Take 1 Tab by mouth daily. , Print, Disp-30 Tab, R-0           2.    Follow-up Information     Follow up With Details Comments Contact Info    None  If symptoms worsen None ([phone]) Patient stated that they have no PCP      [facility] EMERGENCY DEP  If symptoms worsen [address]   [phone]            Return to ED for new or worsening symptoms       Jarold Sicard, MD

## 2017-07-26 NOTE — ED TRIAGE NOTES
Left upper abd pain since last night, denies fever, +n/v, +diarrhea, +dysuria , pt stated it is his pancreatitis

## 2017-07-26 NOTE — DISCHARGE INSTRUCTIONS
We hope that we have addressed all of your medical concerns. The examination and treatment you received in the Emergency Department were for an emergent problem and were not intended as complete care. It is important that you follow up with your healthcare provider(s) for ongoing care. If your symptoms worsen or do not improve as expected, and you are unable to reach your usual health care provider(s), you should return to the Emergency Department. Today's healthcare is undergoing tremendous change, and patient satisfaction surveys are one of the many tools to assess the quality of medical care. You may receive a survey from the [employer] regarding your experience in the Emergency Department. I hope that your experience has been completely positive, particularly the medical care that I provided. As such, please participate in the survey; anything less than excellent does not meet my expectations or intentions. [facility] and 8 [facility] participate in nationally recognized quality of care measures. If your blood pressure is greater than 120/80, as reported below, we urge that you seek medical care to address the potential of high blood pressure, commonly known as hypertension. Hypertension can be hereditary or can be caused by certain medical conditions, pain, stress, or \"white coat syndrome. \"       Please make an appointment with your health care provider(s) for follow up of your Emergency Department visit. VITALS:   Patient Vitals for the past 8 hrs:   Temp Pulse Resp BP SpO2   07/26/17 1631 97.6 °F (36.4 °C) 92 16 (!) 150/96 93 %   07/26/17 1321 98.3 °F (36.8 °C) 98 16 137/90 97 %          Thank you for allowing us to provide you with medical care today. We realize that you have many choices for your emergency care needs. Please choose us in the future for any continued health care needs. Trixie Porras Jozef [facility], [place], operated [facility], [address] [place] [address].   Office: [phone]            Recent Results (from the past 24 hour(s))   CBC WITH AUTOMATED DIFF    Collection Time: 07/26/17  1:48 PM   Result Value Ref Range    WBC 4.6 4.1 - 11.1 K/uL    RBC 5.16 4.10 - 5.70 M/uL    HGB 16.6 12.1 - 17.0 g/dL    HCT 46.7 36.6 - 50.3 %    MCV 90.5 80.0 - 99.0 FL    MCH 32.2 26.0 - 34.0 PG    MCHC 35.5 30.0 - 36.5 g/dL    RDW 12.5 11.5 - 14.5 %    PLATELET 255 041 - 809 K/uL    NEUTROPHILS 29 (L) 32 - 75 %    LYMPHOCYTES 62 (H) 12 - 49 %    MONOCYTES 6 5 - 13 %    EOSINOPHILS 2 0 - 7 %    BASOPHILS 1 0 - 1 %    ABS. NEUTROPHILS 1.3 (L) 1.8 - 8.0 K/UL    ABS. LYMPHOCYTES 2.9 0.8 - 3.5 K/UL    ABS. MONOCYTES 0.3 0.0 - 1.0 K/UL    ABS. EOSINOPHILS 0.1 0.0 - 0.4 K/UL    ABS. BASOPHILS 0.0 0.0 - 0.1 K/UL   METABOLIC PANEL, COMPREHENSIVE    Collection Time: 07/26/17  1:48 PM   Result Value Ref Range    Sodium 140 136 - 145 mmol/L    Potassium 3.7 3.5 - 5.1 mmol/L    Chloride 108 97 - 108 mmol/L    CO2 25 21 - 32 mmol/L    Anion gap 7 5 - 15 mmol/L    Glucose 107 (H) 65 - 100 mg/dL    BUN 13 6 - 20 MG/DL    Creatinine 0.88 0.70 - 1.30 MG/DL    BUN/Creatinine ratio 15 12 - 20      GFR est AA >60 >60 ml/min/1.73m2    GFR est non-AA >60 >60 ml/min/1.73m2    Calcium 9.0 8.5 - 10.1 MG/DL    Bilirubin, total 0.2 0.2 - 1.0 MG/DL    ALT (SGPT) 53 12 - 78 U/L    AST (SGOT) 32 15 - 37 U/L    Alk. phosphatase 80 45 - 117 U/L    Protein, total 8.6 (H) 6.4 - 8.2 g/dL    Albumin 3.6 3.5 - 5.0 g/dL    Globulin 5.0 (H) 2.0 - 4.0 g/dL    A-G Ratio 0.7 (L) 1.1 - 2.2     LIPASE    Collection Time: 07/26/17  1:48 PM   Result Value Ref Range    Lipase 134 73 - 393 U/L       No results found. Abdominal Pain: Care Instructions  Your Care Instructions    Abdominal pain has many possible causes. Some aren't serious and get better on their own in a few days. Others need more testing and treatment.  If your pain continues or gets worse, you need to be rechecked and may need more tests to find out what is wrong. You may need surgery to correct the problem. Don't ignore new symptoms, such as fever, nausea and vomiting, urination problems, pain that gets worse, and dizziness. These may be signs of a more serious problem. Your doctor may have recommended a follow-up visit in the next 8 to 12 hours. If you are not getting better, you may need more tests or treatment. The doctor has checked you carefully, but problems can develop later. If you notice any problems or new symptoms, get medical treatment right away. Follow-up care is a key part of your treatment and safety. Be sure to make and go to all appointments, and call your doctor if you are having problems. It's also a good idea to know your test results and keep a list of the medicines you take. How can you care for yourself at home? · Rest until you feel better. · To prevent dehydration, drink plenty of fluids, enough so that your urine is light yellow or clear like water. Choose water and other caffeine-free clear liquids until you feel better. If you have kidney, heart, or liver disease and have to limit fluids, talk with your doctor before you increase the amount of fluids you drink. · If your stomach is upset, eat mild foods, such as rice, dry toast or crackers, bananas, and applesauce. Try eating several small meals instead of two or three large ones. · Wait until 48 hours after all symptoms have gone away before you have spicy foods, alcohol, and drinks that contain caffeine. · Do not eat foods that are high in fat. · Avoid anti-inflammatory medicines such as aspirin, ibuprofen (Advil, Motrin), and naproxen (Aleve). These can cause stomach upset. Talk to your doctor if you take daily aspirin for another health problem. When should you call for help? Call 911 anytime you think you may need emergency care. For example, call if:  · You passed out (lost consciousness).   · You pass maroon or very bloody stools. · You vomit blood or what looks like coffee grounds. · You have new, severe belly pain. Call your doctor now or seek immediate medical care if:  · Your pain gets worse, especially if it becomes focused in one area of your belly. · You have a new or higher fever. · Your stools are black and look like tar, or they have streaks of blood. · You have unexpected vaginal bleeding. · You have symptoms of a urinary tract infection. These may include:  ¨ Pain when you urinate. ¨ Urinating more often than usual.  ¨ Blood in your urine. · You are dizzy or lightheaded, or you feel like you may faint. Watch closely for changes in your health, and be sure to contact your doctor if:  · You are not getting better after 1 day (24 hours). Where can you learn more? Go to http://romaine-beverly.info/. Enter C616 in the search box to learn more about \"Abdominal Pain: Care Instructions. \"  Current as of: March 20, 2017  Content Version: 11.3  © 3408-9941 [employer]. Care instructions adapted under license by [employer] (which disclaims liability or warranty for this information). If you have questions about a medical condition or this instruction, always ask your healthcare professional. [address] any warranty or liability for your use of this information.

## (undated) DEVICE — NEONATAL-ADULT SPO2 SENSOR: Brand: NELLCOR

## (undated) DEVICE — BW-412T DISP COMBO CLEANING BRUSH: Brand: SINGLE USE COMBINATION CLEANING BRUSH

## (undated) DEVICE — Device

## (undated) DEVICE — BAG SPEC BIOHZD LF 2MIL 6X10IN -- CONVERT TO ITEM 357326

## (undated) DEVICE — CATH IV AUTOGRD BC BLU 22GA 25 -- INSYTE

## (undated) DEVICE — SOLIDIFIER FLUID 3000 CC ABSORB

## (undated) DEVICE — CANN NASAL O2 CAPNOGRAPHY AD -- FILTERLINE

## (undated) DEVICE — 1200 GUARD II KIT W/5MM TUBE W/O VAC TUBE: Brand: GUARDIAN

## (undated) DEVICE — FORCEPS BX L240CM JAW DIA2.8MM L CAP W/ NDL MIC MESH TOOTH

## (undated) DEVICE — KIT IV STRT W CHLORAPREP PD 1ML

## (undated) DEVICE — SET ADMIN 16ML TBNG L100IN 2 Y INJ SITE IV PIGGY BK DISP

## (undated) DEVICE — CONTAINER SPEC 20 ML LID NEUT BUFF FORMALIN 10 % POLYPR STS

## (undated) DEVICE — SET EXTN TBNG L BOR 4 W STPCOCK ST 32IN PRIMING VOL 6ML

## (undated) DEVICE — SYRINGE MED 20ML STD CLR PLAS LUERLOCK TIP N CTRL DISP

## (undated) DEVICE — NEEDLE HYPO 18GA L1.5IN PNK S STL HUB POLYPR SHLD REG BVL

## (undated) DEVICE — KENDALL RADIOLUCENT FOAM MONITORING ELECTRODE -RECTANGULAR SHAPE: Brand: KENDALL

## (undated) DEVICE — ENDO CARRY-ON PROCEDURE KIT INCLUDES ENZYMATIC SPONGE, GAUZE, BIOHAZARD LABEL, TRAY, LUBRICANT, DIRTY SCOPE LABEL, WATER LABEL, TRAY, DRAWSTRING PAD, AND DEFENDO 4-PIECE KIT.: Brand: ENDO CARRY-ON PROCEDURE KIT